# Patient Record
Sex: FEMALE | Race: WHITE | NOT HISPANIC OR LATINO
[De-identification: names, ages, dates, MRNs, and addresses within clinical notes are randomized per-mention and may not be internally consistent; named-entity substitution may affect disease eponyms.]

---

## 2017-01-29 ENCOUNTER — RESULT REVIEW (OUTPATIENT)
Age: 52
End: 2017-01-29

## 2018-02-02 ENCOUNTER — RESULT REVIEW (OUTPATIENT)
Age: 53
End: 2018-02-02

## 2018-08-03 ENCOUNTER — RESULT REVIEW (OUTPATIENT)
Age: 53
End: 2018-08-03

## 2018-10-02 ENCOUNTER — ASOB RESULT (OUTPATIENT)
Age: 53
End: 2018-10-02

## 2018-10-02 ENCOUNTER — APPOINTMENT (OUTPATIENT)
Dept: OBGYN | Facility: CLINIC | Age: 53
End: 2018-10-02
Payer: MEDICARE

## 2018-10-02 PROCEDURE — 76830 TRANSVAGINAL US NON-OB: CPT

## 2018-10-18 ENCOUNTER — APPOINTMENT (OUTPATIENT)
Dept: GYNECOLOGIC ONCOLOGY | Facility: CLINIC | Age: 53
End: 2018-10-18
Payer: MEDICARE

## 2018-10-18 VITALS
HEIGHT: 59 IN | DIASTOLIC BLOOD PRESSURE: 66 MMHG | SYSTOLIC BLOOD PRESSURE: 108 MMHG | WEIGHT: 112 LBS | BODY MASS INDEX: 22.58 KG/M2

## 2018-10-18 DIAGNOSIS — Z80.3 FAMILY HISTORY OF MALIGNANT NEOPLASM OF BREAST: ICD-10-CM

## 2018-10-18 DIAGNOSIS — Z80.8 FAMILY HISTORY OF MALIGNANT NEOPLASM OF OTHER ORGANS OR SYSTEMS: ICD-10-CM

## 2018-10-18 DIAGNOSIS — Z80.1 FAMILY HISTORY OF MALIGNANT NEOPLASM OF TRACHEA, BRONCHUS AND LUNG: ICD-10-CM

## 2018-10-18 DIAGNOSIS — Z80.42 FAMILY HISTORY OF MALIGNANT NEOPLASM OF PROSTATE: ICD-10-CM

## 2018-10-18 DIAGNOSIS — Z80.0 FAMILY HISTORY OF MALIGNANT NEOPLASM OF DIGESTIVE ORGANS: ICD-10-CM

## 2018-10-18 DIAGNOSIS — Z78.9 OTHER SPECIFIED HEALTH STATUS: ICD-10-CM

## 2018-10-18 LAB
HCG UR QL: NEGATIVE
QUALITY CONTROL: YES

## 2018-10-18 PROCEDURE — 57500 BIOPSY OF CERVIX: CPT

## 2018-10-18 PROCEDURE — 81025 URINE PREGNANCY TEST: CPT

## 2018-10-18 PROCEDURE — 99205 OFFICE O/P NEW HI 60 MIN: CPT | Mod: 25

## 2018-10-18 RX ORDER — TAMOXIFEN CITRATE 20 MG/1
20 TABLET, FILM COATED ORAL
Refills: 0 | Status: ACTIVE | COMMUNITY

## 2018-10-23 ENCOUNTER — APPOINTMENT (OUTPATIENT)
Dept: OBGYN | Facility: CLINIC | Age: 53
End: 2018-10-23
Payer: MEDICARE

## 2018-10-23 ENCOUNTER — ASOB RESULT (OUTPATIENT)
Age: 53
End: 2018-10-23

## 2018-10-23 PROCEDURE — 76830 TRANSVAGINAL US NON-OB: CPT

## 2018-10-24 LAB — CORE LAB BIOPSY: NORMAL

## 2019-01-29 ENCOUNTER — APPOINTMENT (OUTPATIENT)
Dept: OBGYN | Facility: CLINIC | Age: 54
End: 2019-01-29
Payer: MEDICARE

## 2019-01-29 ENCOUNTER — ASOB RESULT (OUTPATIENT)
Age: 54
End: 2019-01-29

## 2019-01-29 PROCEDURE — 76830 TRANSVAGINAL US NON-OB: CPT

## 2019-02-05 ENCOUNTER — APPOINTMENT (OUTPATIENT)
Dept: GYNECOLOGIC ONCOLOGY | Facility: CLINIC | Age: 54
End: 2019-02-05
Payer: MEDICARE

## 2019-02-05 VITALS
WEIGHT: 112 LBS | DIASTOLIC BLOOD PRESSURE: 70 MMHG | BODY MASS INDEX: 22.58 KG/M2 | HEIGHT: 59 IN | SYSTOLIC BLOOD PRESSURE: 106 MMHG

## 2019-02-05 DIAGNOSIS — N83.202 UNSPECIFIED OVARIAN CYST, LEFT SIDE: ICD-10-CM

## 2019-02-05 DIAGNOSIS — R18.8 OTHER ASCITES: ICD-10-CM

## 2019-02-05 DIAGNOSIS — N83.9 NONINFLAMMATORY DISORDER OF OVARY, FALLOPIAN TUBE AND BROAD LIGAMENT, UNSPECIFIED: ICD-10-CM

## 2019-02-05 DIAGNOSIS — R93.89 ABNORMAL FINDINGS ON DIAGNOSTIC IMAGING OF OTHER SPECIFIED BODY STRUCTURES: ICD-10-CM

## 2019-02-05 DIAGNOSIS — N83.201 UNSPECIFIED OVARIAN CYST, RIGHT SIDE: ICD-10-CM

## 2019-02-05 PROCEDURE — 58100 BIOPSY OF UTERUS LINING: CPT

## 2019-02-05 PROCEDURE — 99214 OFFICE O/P EST MOD 30 MIN: CPT | Mod: 25

## 2019-02-06 ENCOUNTER — OTHER (OUTPATIENT)
Age: 54
End: 2019-02-06

## 2019-02-07 ENCOUNTER — OTHER (OUTPATIENT)
Age: 54
End: 2019-02-07

## 2019-02-07 LAB — CORE LAB BIOPSY: NORMAL

## 2019-02-14 ENCOUNTER — OUTPATIENT (OUTPATIENT)
Dept: OUTPATIENT SERVICES | Facility: HOSPITAL | Age: 54
LOS: 1 days | End: 2019-02-14
Payer: COMMERCIAL

## 2019-02-14 ENCOUNTER — APPOINTMENT (OUTPATIENT)
Dept: CT IMAGING | Facility: IMAGING CENTER | Age: 54
End: 2019-02-14
Payer: COMMERCIAL

## 2019-02-14 DIAGNOSIS — R18.8 OTHER ASCITES: ICD-10-CM

## 2019-02-14 PROCEDURE — 74177 CT ABD & PELVIS W/CONTRAST: CPT

## 2019-02-14 PROCEDURE — 74177 CT ABD & PELVIS W/CONTRAST: CPT | Mod: 26

## 2019-02-20 ENCOUNTER — TRANSCRIPTION ENCOUNTER (OUTPATIENT)
Age: 54
End: 2019-02-20

## 2019-02-27 PROBLEM — R93.89 THICKENED ENDOMETRIUM: Status: ACTIVE | Noted: 2018-10-18

## 2019-02-27 NOTE — PROCEDURE
[Endometrial Biopsy] : an endometrial biopsy [Thickened Endometrium] : thickened endometrium [Other: ___] : [unfilled] [Patient] : the patient [Verbal Consent] : verbal consent was obtained prior to the procedure and is detailed in the patient's record [Site Verification] : site verification performed. [Time Out] : Time Out Procedure:The following was confirmed prior to the procedure: Correct patient identity, correct site, agreement on the procedure to be done and correct patient position. [Betadine] : betadine [Yes] : the specimen was sent to pathology [No Complications] : none [Tolerated Well] : the patient tolerated the procedure well [Post procedure instructions and information given] : post procedure instructions and information given and reviewed with patient. [0] : 0 [FreeTextEntry1] : Pipelle passed easily

## 2019-02-27 NOTE — LETTER BODY
[Dear  ___] : Dear  [unfilled], [I recently saw our patient [unfilled] for a follow-up visit.] : I recently saw our patient, [unfilled] for a follow-up visit. [Attached please find my note.] : Attached please find my note. [FreeTextEntry2] : Further workup is underway and I will keep you updated on her status. Thank you again for referring this holley patient.  [FreeTextEntry1] : EMB 2/5/19, CT 2/14/19, bloodwork 2/6/19

## 2019-02-27 NOTE — HISTORY OF PRESENT ILLNESS
[FreeTextEntry1] : Ms. Gao is a 53 year old  perimenopausal woman (LMP 10/6/2018) initially referred in 10/2018 for evaluation of thickened endometrial lining and right ovarian cyst. \par \par She has a history of focal atypia of the right breast in 2016 and has been on Tamoxifen since 2016. Plan is to continue Tamoxifen for a total of 5 years. Since , she has been having q6 month ultrasounds. Pelvic sonogram on 8/3/2018 showed an endometrial lining measuring 12.2 mm, subsequent endometrial biopsy 8/3/2018 was negative. Repeat pelvic US on 2018 showed an endometrial lining of 8-9 mm with a simple cyst on the right ovary. Dr. Cavanaugh sent the patient to Dr. Sifuentes (OB/GYN and radiologist) who repeated the pelvic US on 10/2/18 showing an endometrial lining of 15.82 mm, left subcentimeter unilocular cyst, and a right unilocular ovarian cyst with layered debris and increased vascularity noted in the periphery. No additional biopsies have been obtained since 8/3/2018. \par \par Genetic Testing ([employer], 2008): negative \par \par Pelvic US (2018):\par  Uterus: RV\par  Endometrium: 3.6 mm\par  Adnexa: no masses or cysts\par  Cul-de-sac: no FF\par \par Pelvic US (8/3/2018):\par  Uterus: RV\par  Endometrium: 12.2 mm\par  Rt. ovary: no mass/cyst\par  Lt. ovary: simple cyst\par \par Endometrial biopsy (8/3/2018): endometrial tissue showing extensive breakdown changes. \par \par  (8/3/2018): 24; FSH indicative of premenopause.\par \par Pelvic US (2018)\par  Uterus: RV\par  Endometrium: 8-9 mm\par  Rt. ovary: simple cyst \par  Lt. ovary: no mass/cyst\par  Cul-de-sac: no FF\par \par Pelvic US (10/2/2018)\par  Uterus: 7.89 x 5.63 x 5.0 cm\par  Endometrium: 15.82 mm, homogeneous\par  Rt. ovary:4.64 x 4.07 x 3.7 cm; cyst 4 x 4 x 2.3 cm- unilocular, layered debris, increased vascularity noted in periphery\par  Lt. ovary: 1.94 x 1.38 x 1.12 cm; 0.6 cm unilocular cyst-most likely a persistent follicle\par  Cul-de-sac: no FF\par \par 10/18/18 initial consultation visit: posterior cervical biopsy benign. Plan for f/u sono to determine if R ov cyst may have been a CL, in retrospect.\par \par Repeat sono 10/23/18 Interval resolution of R ovarian cyst; 8.5mm homogenous EM; c/w prior menstrual period and resolution of CL cyst. She was advised to f/u with Dr. Cavanaugh and have a repeat sono in 3 months.\par \par 19 Interval history: She has not had any VB since 10/2018.\par F/U sono 19 :  EM 12mm, homogenous. small to moderate free pelvic fluid containing debris, ?blood\par Right tube appears thickened, 3.2 x 1.5cm; some vascularity noted with color flow. Left ovary with 3.8cm hemorrhagic cyst. impression: "thickened tube appears to be a new finding and possibility of neoplasia, though less likely, should be considered. "\par \par Ms. Gao has no other significant PMH. Reports prior history of abnormal pap several years ago s/p D&C (?) with all subsequent paps normal. Family history is significant for breast, colon, and prostate cancer; testing negative for BRCA mutations on 10/24/08. \par \par She is without complaints. Denies pelvic pain, vaginal bleeding/discharge, abdominal bloating/distention, changes in normal bowel/urinary habits, nausea/vomiting, unintentional weight gain/loss, and all other associated signs and symptoms.\par She is here with her  to discuss recent sono findings.\par \par Health Maintenance:\par Pap-2018, negative\par Mammo- breast health per Dr. Cavanaugh\par Colonoscopy- 2017, second degree hemorrhoids, sessile polyp biopsied- tubulovillous adenoma, negative for high grade dysplasia\par \par Referring: Dr. Cavanaugh\par PCP: Dr. Isabel

## 2019-02-28 ENCOUNTER — OTHER (OUTPATIENT)
Age: 54
End: 2019-02-28

## 2019-03-04 ENCOUNTER — RESULT REVIEW (OUTPATIENT)
Age: 54
End: 2019-03-04

## 2019-03-06 ENCOUNTER — OTHER (OUTPATIENT)
Age: 54
End: 2019-03-06

## 2019-03-07 ENCOUNTER — OTHER (OUTPATIENT)
Age: 54
End: 2019-03-07

## 2019-03-10 ENCOUNTER — OUTPATIENT (OUTPATIENT)
Dept: OUTPATIENT SERVICES | Facility: HOSPITAL | Age: 54
LOS: 1 days | End: 2019-03-10
Payer: COMMERCIAL

## 2019-03-10 ENCOUNTER — APPOINTMENT (OUTPATIENT)
Dept: MRI IMAGING | Facility: IMAGING CENTER | Age: 54
End: 2019-03-10
Payer: COMMERCIAL

## 2019-03-10 DIAGNOSIS — N83.202 UNSPECIFIED OVARIAN CYST, LEFT SIDE: ICD-10-CM

## 2019-03-10 PROCEDURE — 72197 MRI PELVIS W/O & W/DYE: CPT | Mod: 26

## 2019-03-10 PROCEDURE — 74183 MRI ABD W/O CNTR FLWD CNTR: CPT | Mod: 26

## 2019-03-10 PROCEDURE — 72197 MRI PELVIS W/O & W/DYE: CPT

## 2019-03-10 PROCEDURE — A9585: CPT

## 2019-03-10 PROCEDURE — 74183 MRI ABD W/O CNTR FLWD CNTR: CPT

## 2019-03-14 ENCOUNTER — APPOINTMENT (OUTPATIENT)
Dept: SURGICAL ONCOLOGY | Facility: CLINIC | Age: 54
End: 2019-03-14
Payer: COMMERCIAL

## 2019-03-14 VITALS
RESPIRATION RATE: 15 BRPM | SYSTOLIC BLOOD PRESSURE: 113 MMHG | WEIGHT: 114 LBS | DIASTOLIC BLOOD PRESSURE: 79 MMHG | HEIGHT: 59 IN | BODY MASS INDEX: 22.98 KG/M2 | OXYGEN SATURATION: 98 % | HEART RATE: 89 BPM

## 2019-03-14 PROCEDURE — 99245 OFF/OP CONSLTJ NEW/EST HI 55: CPT

## 2019-03-18 NOTE — CONSULT LETTER
[Dear  ___] : Dear ~ANYI, [Consult Letter:] : I had the pleasure of evaluating your patient, [unfilled]. [Please see my note below.] : Please see my note below. [Consult Closing:] : Thank you very much for allowing me to participate in the care of this patient.  If you have any questions, please do not hesitate to contact me. [FreeTextEntry2] : Dr. Jessica Acosta [Sincerely,] : Sincerely, [FreeTextEntry3] : Yuan Moon\par [phone] [DrKrishan  ___] : Dr. MCQUEEN [DrKrishan ___] : Dr. MCQUEEN

## 2019-03-18 NOTE — REASON FOR VISIT
[Initial Consultation] : an initial consultation for [Family Member] : family member [Referred By: ___] : Referred By: [unfilled]

## 2019-03-18 NOTE — ASSESSMENT
[FreeTextEntry1] : 54 y/o female with hypervascular pancreatic body lesion.\par \par Plan:  CT scan imaging review with patient.  Findings are most consistent with an neuroendocrine tumor of the pancreas.  Inability to visualize lesion on MRI may be related to timing of contrast.  Tissue diagnosis is indicated.  Will refer to Dr. Travis Mckenzie of [facility] for EUS/FNA.  Once diagnosis obtained, will discuss need for minimally invasive distal pancreatectomy, possible spleen preserving if indicated. If pancreatic NET and low grade, I explained these lesions have excellent prognosis after resection and do not require adjuvant treatment when nonmetastatic.  All questions answered.

## 2019-03-18 NOTE — PHYSICAL EXAM
[Normal] : supple, no neck mass and thyroid not enlarged [Normal Neck Lymph Nodes] : normal neck lymph nodes  [Normal Supraclavicular Lymph Nodes] : normal supraclavicular lymph nodes [Normal Groin Lymph Nodes] : normal groin lymph nodes [Normal Axillary Lymph Nodes] : normal axillary lymph nodes [Normal] : grossly intact

## 2019-03-28 ENCOUNTER — APPOINTMENT (OUTPATIENT)
Dept: GASTROENTEROLOGY | Facility: CLINIC | Age: 54
End: 2019-03-28
Payer: COMMERCIAL

## 2019-03-28 VITALS
DIASTOLIC BLOOD PRESSURE: 60 MMHG | HEART RATE: 82 BPM | OXYGEN SATURATION: 97 % | BODY MASS INDEX: 23.39 KG/M2 | HEIGHT: 59 IN | RESPIRATION RATE: 14 BRPM | WEIGHT: 116 LBS | SYSTOLIC BLOOD PRESSURE: 98 MMHG | TEMPERATURE: 97.7 F

## 2019-03-28 PROCEDURE — 99203 OFFICE O/P NEW LOW 30 MIN: CPT

## 2019-04-09 ENCOUNTER — APPOINTMENT (OUTPATIENT)
Age: 54
End: 2019-04-09
Payer: COMMERCIAL

## 2019-04-09 ENCOUNTER — OUTPATIENT (OUTPATIENT)
Dept: OUTPATIENT SERVICES | Facility: HOSPITAL | Age: 54
LOS: 1 days | End: 2019-04-09
Payer: COMMERCIAL

## 2019-04-09 DIAGNOSIS — Z00.8 ENCOUNTER FOR OTHER GENERAL EXAMINATION: ICD-10-CM

## 2019-04-09 PROCEDURE — 71260 CT THORAX DX C+: CPT

## 2019-04-09 PROCEDURE — 71260 CT THORAX DX C+: CPT | Mod: 26

## 2019-04-09 NOTE — HISTORY OF PRESENT ILLNESS
[FreeTextEntry1] : 53 year old female referred by Dr. Yuan Moon for evaluation of 9 mm pancreatic nodule seen on CT. Lesion is suspicious for islet cell tumor as it is hypervascular and isodense. There is also scattered LNs in the chest and mediastinum.

## 2019-04-09 NOTE — PHYSICAL EXAM
[General Appearance - In No Acute Distress] : in no acute distress [General Appearance - Alert] : alert [General Appearance - Well Developed] : well developed [General Appearance - Well Nourished] : well nourished [Sclera] : the sclera and conjunctiva were normal [Neck Cervical Mass (___cm)] : no neck mass was observed [Neck Appearance] : the appearance of the neck was normal [Exaggerated Use Of Accessory Muscles For Inspiration] : no accessory muscle use [Jugular Venous Distention Increased] : there was no jugular-venous distention [Heart Sounds] : normal S1 and S2 [Bowel Sounds] : normal bowel sounds [Edema] : there was no peripheral edema [Abdomen Soft] : soft [Abdomen Tenderness] : non-tender [Abdomen Mass (___ Cm)] : no abdominal mass palpated [Cervical Lymph Nodes Enlarged Posterior Bilaterally] : posterior cervical [Cervical Lymph Nodes Enlarged Anterior Bilaterally] : anterior cervical [Nail Clubbing] : no clubbing  or cyanosis of the fingernails [No CVA Tenderness] : no ~M costovertebral angle tenderness [] : no rash [No Focal Deficits] : no focal deficits [Impaired Insight] : insight and judgment were intact [Affect] : the affect was normal [Oriented To Time, Place, And Person] : oriented to person, place, and time

## 2019-04-09 NOTE — ASSESSMENT
[FreeTextEntry1] : Imaging personally reviewed. Plan for EUS FNA of pancreatic lesions an/or any suspicious LNs\par

## 2019-04-15 ENCOUNTER — APPOINTMENT (OUTPATIENT)
Dept: SURGICAL ONCOLOGY | Facility: CLINIC | Age: 54
End: 2019-04-15
Payer: COMMERCIAL

## 2019-04-15 VITALS
WEIGHT: 116 LBS | RESPIRATION RATE: 16 BRPM | OXYGEN SATURATION: 98 % | SYSTOLIC BLOOD PRESSURE: 125 MMHG | BODY MASS INDEX: 23.39 KG/M2 | HEART RATE: 82 BPM | DIASTOLIC BLOOD PRESSURE: 87 MMHG | HEIGHT: 59 IN

## 2019-04-15 PROCEDURE — 99244 OFF/OP CNSLTJ NEW/EST MOD 40: CPT

## 2019-04-18 ENCOUNTER — OUTPATIENT (OUTPATIENT)
Dept: OUTPATIENT SERVICES | Facility: HOSPITAL | Age: 54
LOS: 1 days | Discharge: HOME | End: 2019-04-18

## 2019-04-18 DIAGNOSIS — Z15.09 GENETIC SUSCEPTIBILITY TO OTHER MALIGNANT NEOPLASM: ICD-10-CM

## 2019-04-22 ENCOUNTER — APPOINTMENT (OUTPATIENT)
Dept: HEMATOLOGY ONCOLOGY | Facility: CLINIC | Age: 54
End: 2019-04-22
Payer: SELF-PAY

## 2019-04-22 ENCOUNTER — LABORATORY RESULT (OUTPATIENT)
Age: 54
End: 2019-04-22

## 2019-04-22 PROCEDURE — 96040M: CUSTOM

## 2019-04-22 PROCEDURE — 99499A: CUSTOM | Mod: NC

## 2019-04-23 ENCOUNTER — OUTPATIENT (OUTPATIENT)
Dept: OUTPATIENT SERVICES | Facility: HOSPITAL | Age: 54
LOS: 1 days | End: 2019-04-23
Payer: COMMERCIAL

## 2019-04-23 ENCOUNTER — APPOINTMENT (OUTPATIENT)
Age: 54
End: 2019-04-23

## 2019-04-23 ENCOUNTER — RESULT REVIEW (OUTPATIENT)
Age: 54
End: 2019-04-23

## 2019-04-23 DIAGNOSIS — K86.9 DISEASE OF PANCREAS, UNSPECIFIED: ICD-10-CM

## 2019-04-23 PROCEDURE — 88312 SPECIAL STAINS GROUP 1: CPT

## 2019-04-23 PROCEDURE — 88342 IMHCHEM/IMCYTCHM 1ST ANTB: CPT

## 2019-04-23 PROCEDURE — 88341 IMHCHEM/IMCYTCHM EA ADD ANTB: CPT | Mod: 26,59

## 2019-04-23 PROCEDURE — 88305 TISSUE EXAM BY PATHOLOGIST: CPT | Mod: 26

## 2019-04-23 PROCEDURE — 88313 SPECIAL STAINS GROUP 2: CPT

## 2019-04-23 PROCEDURE — 88305 TISSUE EXAM BY PATHOLOGIST: CPT

## 2019-04-23 PROCEDURE — 88313 SPECIAL STAINS GROUP 2: CPT | Mod: 26

## 2019-04-23 PROCEDURE — 88342 IMHCHEM/IMCYTCHM 1ST ANTB: CPT | Mod: 26,59

## 2019-04-23 PROCEDURE — 88312 SPECIAL STAINS GROUP 1: CPT | Mod: 26

## 2019-04-23 PROCEDURE — 88341 IMHCHEM/IMCYTCHM EA ADD ANTB: CPT

## 2019-04-23 PROCEDURE — 43242 EGD US FINE NEEDLE BX/ASPIR: CPT

## 2019-04-23 PROCEDURE — 88360 TUMOR IMMUNOHISTOCHEM/MANUAL: CPT

## 2019-04-23 PROCEDURE — 88360 TUMOR IMMUNOHISTOCHEM/MANUAL: CPT | Mod: 26

## 2019-04-23 PROCEDURE — 43242 EGD US FINE NEEDLE BX/ASPIR: CPT | Mod: GC

## 2019-04-23 PROCEDURE — 88307 TISSUE EXAM BY PATHOLOGIST: CPT | Mod: 26

## 2019-04-23 PROCEDURE — 88307 TISSUE EXAM BY PATHOLOGIST: CPT

## 2019-05-07 ENCOUNTER — APPOINTMENT (OUTPATIENT)
Dept: SURGICAL ONCOLOGY | Facility: CLINIC | Age: 54
End: 2019-05-07
Payer: COMMERCIAL

## 2019-05-07 VITALS
RESPIRATION RATE: 15 BRPM | HEIGHT: 59 IN | HEART RATE: 89 BPM | BODY MASS INDEX: 22.98 KG/M2 | DIASTOLIC BLOOD PRESSURE: 85 MMHG | WEIGHT: 114 LBS | SYSTOLIC BLOOD PRESSURE: 123 MMHG

## 2019-05-07 PROCEDURE — 99214 OFFICE O/P EST MOD 30 MIN: CPT

## 2019-05-08 NOTE — REASON FOR VISIT
[Initial Consultation] : an initial consultation for [Other: _____] : [unfilled] [FreeTextEntry2] : Right breast atypia

## 2019-05-08 NOTE — ASSESSMENT
[FreeTextEntry1] : Breast imaging:\par December 2018 bilateral mammogram and sonogram @[facility] were ok; imaging data submitted for entry into our system.\par These will be repeated December 2019...............................\par \par Her annual breast MRI (Ghada score 20) should be June 2019, at our facility, prescriptions entered today\par \par Clinically she appears to be doing well.\par \par Her there are no problems we will see her in approximately 6 months, sooner if needed

## 2019-05-08 NOTE — HISTORY OF PRESENT ILLNESS
[de-identified] : 53-year-old lady (originally under the care of Dr. Piper) who I had seen for periodic breast examinations, commencing in 2000.\par \par I last examined her 2014.\par \par 2016 she was diagnosed with atypia on a right breast biopsy.- DCD\par +incidental SARCOID - NO rheum eval yet\par + Tamoxifen: Dr. Will Gonzalez\par \par (19:\par Seen by Dr Sarah Martinez (pul) for bilateral Pulmonary nodules on CT chest.\par No intervention warranted.\par Ongoing followup planned).\par \par She has had nine other breast biopsies:\par Bilateral excisional breast biopsies were benign.\par She has had 5 benign biopsies.\par A single left breast needle biopsy was also benign.\par 2013 right MRI core biopsy benign and concordant\par \par \par Ghada score= 20\par \par NO personal  history of breast or ovarian cancer.\par \par +FH:\par A maternal aunt had breast cancer at 68\par Her maternal grandmother had breast cancer in her late 60s\par TWO Maternal cousins had ovarian cancer in their 50s\par \par Her own genetic testing demonstrates NO deleterious dictations.\par Since her original testing was ~, she has a followup appointment scheduled with Cristina Tucker in 2019 for a broader assessment\par \par +ASHKENAZI\par \par Menarche at 13.\par , first at 32\par \par Her gynecologist is Dr. Theodore ARRINGTON\par 2019 Pap smear was normal\par 2018 she was seen by gyn-onc (Dr. Jessica Acosta) for a thickened endometrium.\par \par Her internist is Dr. Arnoldo Isabel.\par Her only current medication is tamoxifen (above)\par \par Colonoscopy: @51 with DR Wilberto TINAJERO, ok x 5 yr\par +FH:\par Father had colorectal cancer\par \par \par 2019 she was seen by my associate RITO for evaluation of an incidental 9 mm hypervascular with pancreatic body mass on CT abdomen.\par Subsequent MRI abdomen identified scattered bilateral pulmonary nodules, the study failed to duplicate the pancreatic finding.\par Findings felt most consistent with a neuroendocrine tumor she was referred to GI to see Dr. Travis Mckenzie for EUS/FNA.\par Consultations , procedure is sched'd for 2109\par \par \par Regarding the incidental pulmonary nodules described above, she will be scheduled an appointment with Dr. Liu Martinez (pulmonary medicine)\par \par \par She also reports a two-month history of intermittent numbness in the right upper extremity.\par No preceding injury.\par No provocative a palliative factors.\par No other neurologic signs or symptoms.\par \par I provided her with the contact information for Dr. Art Butler for a formal evaluation

## 2019-05-08 NOTE — REVIEW OF SYSTEMS
[Negative] : Endocrine [FreeTextEntry8] : Pancreatic neoplasm [FreeTextEntry1] : increased risk of breast cancer

## 2019-05-08 NOTE — PHYSICAL EXAM
[Normal] : supple, no neck mass and thyroid not enlarged [Normal Neck Lymph Nodes] : normal neck lymph nodes  [Normal Supraclavicular Lymph Nodes] : normal supraclavicular lymph nodes [Normal Axillary Lymph Nodes] : normal axillary lymph nodes [Normal Groin Lymph Nodes] : normal groin lymph nodes [Normal] : full range of motion and no deformities appreciated [de-identified] : groins not examined [de-identified] : below

## 2019-05-09 NOTE — HISTORY OF PRESENT ILLNESS
[de-identified] : Ms. Gao is a 54 y/o female with history of breast atypia on Tamoxifen who was found on CT abdomen/pelvis 02/14/2019 to have a new 9 mm hypervascular mid-pancreatic body mass for which islet cell tumor was considered.  No regional adenopathy was identified.  A subsequent MRI abdomen 03/10/2019 demonstrated scatter bilateral pulmonary nodule and partially images mediastinal/hilar adenopathy.  Pancreatic lesion was not visualized on study.  She is awaiting CT chest at this time.\par Patient denies abdominal pain, nausea/emesis or weight loss. She denies flushing, palpitations or diarrhea.\par \par 05/07/2019:  Patient is s/p EUS/FNA 04/23/2019.  She tolerated the procedure well.  Pathology of 9 mm pancreatic body lesion demonstrates lesion positive for synaptophysin and chromogranin suggestive of neuroendocrine tumor with low Ki-67 proliferation index.  However, a solid pseudopapillary neoplasm was not ruled out.\par Patient continues to feel well and has no abdominal complaints.

## 2019-05-09 NOTE — ASSESSMENT
[FreeTextEntry1] : 52 y/o female with early nonfunctional pancreatic neuroendocrine tumor.\par \par Plan:  Gallium-dotatate PET/CT is indicated to evaluate extent of disease.  Surgical resection is indicated, especially given patient's young age, which would likely be a spleen sparing distal pancreatectomy at this size.  However, there is no urgency for surgery at this time given small lesion and monitoring is also reasonable and safe, but would be lifelong with risk of progression.  Patient will consider her options and return to office for further discussion after PET/CT.  All questions answered.

## 2019-05-12 ENCOUNTER — APPOINTMENT (OUTPATIENT)
Dept: MRI IMAGING | Facility: IMAGING CENTER | Age: 54
End: 2019-05-12

## 2019-05-17 ENCOUNTER — OUTPATIENT (OUTPATIENT)
Dept: OUTPATIENT SERVICES | Facility: HOSPITAL | Age: 54
LOS: 1 days | Discharge: HOME | End: 2019-05-17

## 2019-05-17 DIAGNOSIS — Z15.09 GENETIC SUSCEPTIBILITY TO OTHER MALIGNANT NEOPLASM: ICD-10-CM

## 2019-05-21 ENCOUNTER — APPOINTMENT (OUTPATIENT)
Dept: HEMATOLOGY ONCOLOGY | Facility: CLINIC | Age: 54
End: 2019-05-21
Payer: SELF-PAY

## 2019-05-21 PROCEDURE — 99499A: CUSTOM | Mod: NC

## 2019-05-22 ENCOUNTER — OTHER (OUTPATIENT)
Age: 54
End: 2019-05-22

## 2019-05-28 ENCOUNTER — APPOINTMENT (OUTPATIENT)
Dept: NUCLEAR MEDICINE | Facility: IMAGING CENTER | Age: 54
End: 2019-05-28
Payer: COMMERCIAL

## 2019-05-28 ENCOUNTER — APPOINTMENT (OUTPATIENT)
Dept: MRI IMAGING | Facility: IMAGING CENTER | Age: 54
End: 2019-05-28
Payer: COMMERCIAL

## 2019-05-28 ENCOUNTER — OUTPATIENT (OUTPATIENT)
Dept: OUTPATIENT SERVICES | Facility: HOSPITAL | Age: 54
LOS: 1 days | End: 2019-05-28
Payer: COMMERCIAL

## 2019-05-28 DIAGNOSIS — M54.12 RADICULOPATHY, CERVICAL REGION: ICD-10-CM

## 2019-05-28 PROCEDURE — 72141 MRI NECK SPINE W/O DYE: CPT

## 2019-05-28 PROCEDURE — 72141 MRI NECK SPINE W/O DYE: CPT | Mod: 26

## 2019-06-03 ENCOUNTER — APPOINTMENT (OUTPATIENT)
Dept: NUCLEAR MEDICINE | Facility: IMAGING CENTER | Age: 54
End: 2019-06-03

## 2019-06-27 ENCOUNTER — FORM ENCOUNTER (OUTPATIENT)
Age: 54
End: 2019-06-27

## 2019-06-28 ENCOUNTER — APPOINTMENT (OUTPATIENT)
Dept: MRI IMAGING | Facility: IMAGING CENTER | Age: 54
End: 2019-06-28
Payer: COMMERCIAL

## 2019-06-28 ENCOUNTER — OUTPATIENT (OUTPATIENT)
Dept: OUTPATIENT SERVICES | Facility: HOSPITAL | Age: 54
LOS: 1 days | End: 2019-06-28
Payer: COMMERCIAL

## 2019-06-28 DIAGNOSIS — Z00.8 ENCOUNTER FOR OTHER GENERAL EXAMINATION: ICD-10-CM

## 2019-06-28 DIAGNOSIS — N60.91 UNSPECIFIED BENIGN MAMMARY DYSPLASIA OF RIGHT BREAST: ICD-10-CM

## 2019-06-28 PROCEDURE — 77049 MRI BREAST C-+ W/CAD BI: CPT | Mod: 26

## 2019-06-28 PROCEDURE — C8937: CPT

## 2019-06-28 PROCEDURE — C8908: CPT

## 2019-06-28 PROCEDURE — A9585: CPT

## 2019-09-05 ENCOUNTER — APPOINTMENT (OUTPATIENT)
Dept: SURGICAL ONCOLOGY | Facility: CLINIC | Age: 54
End: 2019-09-05
Payer: COMMERCIAL

## 2019-09-05 VITALS
BODY MASS INDEX: 23.18 KG/M2 | SYSTOLIC BLOOD PRESSURE: 120 MMHG | DIASTOLIC BLOOD PRESSURE: 80 MMHG | HEART RATE: 89 BPM | OXYGEN SATURATION: 98 % | WEIGHT: 115 LBS | HEIGHT: 59 IN

## 2019-09-05 PROCEDURE — 99214 OFFICE O/P EST MOD 30 MIN: CPT

## 2019-10-31 ENCOUNTER — APPOINTMENT (OUTPATIENT)
Dept: GASTROENTEROLOGY | Facility: CLINIC | Age: 54
End: 2019-10-31
Payer: COMMERCIAL

## 2019-10-31 VITALS
DIASTOLIC BLOOD PRESSURE: 64 MMHG | HEIGHT: 59 IN | WEIGHT: 117 LBS | OXYGEN SATURATION: 97 % | SYSTOLIC BLOOD PRESSURE: 114 MMHG | HEART RATE: 101 BPM | BODY MASS INDEX: 23.59 KG/M2 | RESPIRATION RATE: 14 BRPM | TEMPERATURE: 97.5 F

## 2019-10-31 DIAGNOSIS — K86.89 OTHER SPECIFIED DISEASES OF PANCREAS: ICD-10-CM

## 2019-10-31 PROCEDURE — 99212 OFFICE O/P EST SF 10 MIN: CPT

## 2019-11-14 NOTE — HISTORY OF PRESENT ILLNESS
[FreeTextEntry1] : 54 previously referred for EUS bx of a pancreas lesion which was small and indeterminant. This was found to be a low proliferation neuroendocrine. It does not appear to be a functional PNET. A recent scan did not identify it but she is scheduled for an MRI at [facility]. She decided not to undergo a resection and to monitor it based on advice form an oncologist there. She denies any flushing, sweating, hypoglycemia, or episodes of pain. No jaundice or icterus. No pruritus.

## 2019-11-14 NOTE — PHYSICAL EXAM
[General Appearance - In No Acute Distress] : in no acute distress [General Appearance - Alert] : alert [General Appearance - Well Developed] : well developed [Sclera] : the sclera and conjunctiva were normal [General Appearance - Well Nourished] : well nourished [Neck Appearance] : the appearance of the neck was normal [Neck Cervical Mass (___cm)] : no neck mass was observed [Jugular Venous Distention Increased] : there was no jugular-venous distention [Exaggerated Use Of Accessory Muscles For Inspiration] : no accessory muscle use [Edema] : there was no peripheral edema [Heart Sounds] : normal S1 and S2 [Abdomen Soft] : soft [Abdomen Tenderness] : non-tender [Bowel Sounds] : normal bowel sounds [Abdomen Mass (___ Cm)] : no abdominal mass palpated [Cervical Lymph Nodes Enlarged Anterior Bilaterally] : anterior cervical [No CVA Tenderness] : no ~M costovertebral angle tenderness [Cervical Lymph Nodes Enlarged Posterior Bilaterally] : posterior cervical [] : no rash [Nail Clubbing] : no clubbing  or cyanosis of the fingernails [No Focal Deficits] : no focal deficits [Oriented To Time, Place, And Person] : oriented to person, place, and time [Affect] : the affect was normal [Impaired Insight] : insight and judgment were intact

## 2019-11-14 NOTE — ASSESSMENT
[FreeTextEntry1] : Low proliferation index neuroendocrine tumor of the pancreas neck\par Low risk overall\par She has had a second opinion form [facility] and she wants to survey\par She will be getting an MRI\par We will wait to see what this shows

## 2019-12-18 ENCOUNTER — TRANSCRIPTION ENCOUNTER (OUTPATIENT)
Age: 54
End: 2019-12-18

## 2020-01-02 ENCOUNTER — FORM ENCOUNTER (OUTPATIENT)
Age: 55
End: 2020-01-02

## 2020-01-03 ENCOUNTER — APPOINTMENT (OUTPATIENT)
Dept: ULTRASOUND IMAGING | Facility: IMAGING CENTER | Age: 55
End: 2020-01-03
Payer: COMMERCIAL

## 2020-01-03 ENCOUNTER — OUTPATIENT (OUTPATIENT)
Dept: OUTPATIENT SERVICES | Facility: HOSPITAL | Age: 55
LOS: 1 days | End: 2020-01-03
Payer: COMMERCIAL

## 2020-01-03 ENCOUNTER — APPOINTMENT (OUTPATIENT)
Dept: MAMMOGRAPHY | Facility: IMAGING CENTER | Age: 55
End: 2020-01-03
Payer: COMMERCIAL

## 2020-01-03 DIAGNOSIS — Z00.8 ENCOUNTER FOR OTHER GENERAL EXAMINATION: ICD-10-CM

## 2020-01-03 PROCEDURE — 76641 ULTRASOUND BREAST COMPLETE: CPT | Mod: 26,50

## 2020-01-03 PROCEDURE — 77063 BREAST TOMOSYNTHESIS BI: CPT

## 2020-01-03 PROCEDURE — 77063 BREAST TOMOSYNTHESIS BI: CPT | Mod: 26

## 2020-01-03 PROCEDURE — 77067 SCR MAMMO BI INCL CAD: CPT | Mod: 26

## 2020-01-03 PROCEDURE — 77067 SCR MAMMO BI INCL CAD: CPT

## 2020-01-03 PROCEDURE — 76641 ULTRASOUND BREAST COMPLETE: CPT

## 2020-03-10 ENCOUNTER — RESULT REVIEW (OUTPATIENT)
Age: 55
End: 2020-03-10

## 2020-07-06 ENCOUNTER — APPOINTMENT (OUTPATIENT)
Dept: ULTRASOUND IMAGING | Facility: IMAGING CENTER | Age: 55
End: 2020-07-06
Payer: COMMERCIAL

## 2020-07-06 ENCOUNTER — RESULT REVIEW (OUTPATIENT)
Age: 55
End: 2020-07-06

## 2020-07-06 ENCOUNTER — OUTPATIENT (OUTPATIENT)
Dept: OUTPATIENT SERVICES | Facility: HOSPITAL | Age: 55
LOS: 1 days | End: 2020-07-06
Payer: COMMERCIAL

## 2020-07-06 DIAGNOSIS — Z00.8 ENCOUNTER FOR OTHER GENERAL EXAMINATION: ICD-10-CM

## 2020-07-06 PROCEDURE — 76642 ULTRASOUND BREAST LIMITED: CPT | Mod: 26,RT

## 2020-07-06 PROCEDURE — 76642 ULTRASOUND BREAST LIMITED: CPT

## 2020-07-09 ENCOUNTER — RESULT REVIEW (OUTPATIENT)
Age: 55
End: 2020-07-09

## 2020-07-09 ENCOUNTER — APPOINTMENT (OUTPATIENT)
Dept: MRI IMAGING | Facility: IMAGING CENTER | Age: 55
End: 2020-07-09
Payer: COMMERCIAL

## 2020-07-09 ENCOUNTER — OUTPATIENT (OUTPATIENT)
Dept: OUTPATIENT SERVICES | Facility: HOSPITAL | Age: 55
LOS: 1 days | End: 2020-07-09
Payer: COMMERCIAL

## 2020-07-09 DIAGNOSIS — Z00.8 ENCOUNTER FOR OTHER GENERAL EXAMINATION: ICD-10-CM

## 2020-07-09 DIAGNOSIS — N60.91 UNSPECIFIED BENIGN MAMMARY DYSPLASIA OF RIGHT BREAST: ICD-10-CM

## 2020-07-09 PROCEDURE — A9585: CPT

## 2020-07-09 PROCEDURE — C8937: CPT

## 2020-07-09 PROCEDURE — 77049 MRI BREAST C-+ W/CAD BI: CPT | Mod: 26

## 2020-07-09 PROCEDURE — C8908: CPT

## 2020-08-05 NOTE — PHYSICAL EXAM
[Normal] : supple, no neck mass and thyroid not enlarged [Normal Neck Lymph Nodes] : normal neck lymph nodes  [Normal Supraclavicular Lymph Nodes] : normal supraclavicular lymph nodes [Normal Groin Lymph Nodes] : normal groin lymph nodes [Normal Axillary Lymph Nodes] : normal axillary lymph nodes [Normal] : normal appearance, no rash, nodules, vesicles, ulcers, erythema [de-identified] : groins not examined [de-identified] : below

## 2020-08-05 NOTE — REVIEW OF SYSTEMS
[Negative] : Integumentary [FreeTextEntry8] : Pancreatic nodule [FreeTextEntry1] : Increased risk of breast cancer

## 2020-08-05 NOTE — ASSESSMENT
[FreeTextEntry1] : June 2019 breast MRI at 450: BI-RADS 2.\par We'll repeat June 2020 (Ghada score 20), prescription entered\par \par Annual mammogram and ultrasound are December 2019 at [facility], prescription provided.\par \par Clinically doing well.\par \par If no problems we will see her in another year, sooner if needed\par \par \par January 6, 2020.\par Annual, bilateral, mammogram and ultrasound at 450: BI-RADS 3.\par Right breast (3:00) six-month followup sonogram recommended.\par Prescription entered today\par \par \par 07-06-20:\par Targeted right breast ultrasound (short-term follow-up (3:00) at 450: BI-RADS 3.\par Bilateral breast imaging due in January 2021.\par Prescriptions entered July 21, 2020\par \par \par 07-09-20:\par Breast MRI at 450: BI-RADS 2.\par Lary notified her of the results.\par We'll repeat periodically balancing her increased risk of breast cancer (detail score 20) against the concern of cumulative gadolinium exposure.............................

## 2020-08-05 NOTE — REASON FOR VISIT
[Follow-Up Visit] : a follow-up visit for [Other: _____] : [unfilled] [FreeTextEntry2] : Increased risk of breast cancer

## 2020-08-05 NOTE — HISTORY OF PRESENT ILLNESS
[de-identified] : 54 year-old lady (originally under the care of Dr. Piper) who I have seen for periodic breast examinations, commencing in 2000.\par \par \par 2016 she was diagnosed with atypia on a right breast biopsy.- DCD\par +incidental SARCOID - NO rheum eval yet\par + Tamoxifen: Dr. Will Gonzalez\par \par (19:\par Seen by Dr Sarah Martinez (pul) for bilateral Pulmonary nodules on CT chest.\par No intervention warranted.\par Ongoing followup).\par \par She has had nine other breast biopsies:\par (Bilateral excisional breast biopsies were benign.\par She has had 5 benign biopsies.\par A single left breast needle biopsy was also benign.\par 2013 right MRI core biopsy benign and concordant)\par \par \par Ghada score= 20\par \par NO personal  history of breast or ovarian cancer.\par \par +FH:\par A maternal aunt had breast cancer at 68\par Her maternal grandmother had breast cancer in her late 60s\par TWO Maternal cousins had ovarian cancer in their 50s\par \par Her own genetic testing demonstrates NO deleterious dictations.\par Since her original testing was ~, she has a followup appointment scheduled with Cristina Tucker in 2019 for a broader assessment - Unremarkable\par \par +ASHKENAZI\par \par Menarche at 13.\par , first at 32\par \par Her gynecologist is Dr. Theodore ARRINGTON\par 2019 Pap smear was normal\par 2018 she was seen by gyn-onc (Dr. Jessica Acosta) for a thickened endometrium.\par \par Her internist is Dr. Arnoldo MCINTYRE.\par Her only current medication is tamoxifen (above)\par \par Colonoscopy: @51 with DR Wilberto TINAJERO, ok x 5 yr\par +FH:\par Father had colorectal cancer\par \par Known, asymptomatic, incidental, ~1 cm pancreatic mass.\par EUS biopsy: Nonfunctioning neuroendocrine tumor versus solid pseudo-papillary neoplasm.\par She had seen my partner, JW.\par She now follows up at [facility].\par

## 2020-09-03 ENCOUNTER — APPOINTMENT (OUTPATIENT)
Dept: SURGICAL ONCOLOGY | Facility: CLINIC | Age: 55
End: 2020-09-03
Payer: COMMERCIAL

## 2020-09-03 VITALS
SYSTOLIC BLOOD PRESSURE: 120 MMHG | RESPIRATION RATE: 15 BRPM | BODY MASS INDEX: 23.18 KG/M2 | HEIGHT: 59 IN | DIASTOLIC BLOOD PRESSURE: 81 MMHG | WEIGHT: 115 LBS | HEART RATE: 89 BPM | OXYGEN SATURATION: 98 %

## 2020-09-03 PROCEDURE — 99214 OFFICE O/P EST MOD 30 MIN: CPT

## 2020-10-31 ENCOUNTER — TRANSCRIPTION ENCOUNTER (OUTPATIENT)
Age: 55
End: 2020-10-31

## 2020-11-01 RX ORDER — SODIUM CHLORIDE 9 MG/ML
1000 INJECTION INTRAMUSCULAR; INTRAVENOUS; SUBCUTANEOUS
Refills: 0 | Status: DISCONTINUED | OUTPATIENT
Start: 2020-11-02 | End: 2020-11-16

## 2020-11-02 ENCOUNTER — OUTPATIENT (OUTPATIENT)
Dept: OUTPATIENT SERVICES | Facility: HOSPITAL | Age: 55
LOS: 1 days | Discharge: HOME | End: 2020-11-02
Payer: COMMERCIAL

## 2020-11-02 ENCOUNTER — APPOINTMENT (OUTPATIENT)
Dept: GASTROENTEROLOGY | Facility: HOSPITAL | Age: 55
End: 2020-11-02

## 2020-11-02 VITALS
DIASTOLIC BLOOD PRESSURE: 96 MMHG | SYSTOLIC BLOOD PRESSURE: 135 MMHG | RESPIRATION RATE: 22 BRPM | HEIGHT: 59 IN | TEMPERATURE: 98 F | WEIGHT: 113.98 LBS | OXYGEN SATURATION: 100 % | HEART RATE: 85 BPM

## 2020-11-02 VITALS
HEART RATE: 73 BPM | OXYGEN SATURATION: 100 % | SYSTOLIC BLOOD PRESSURE: 110 MMHG | DIASTOLIC BLOOD PRESSURE: 74 MMHG | RESPIRATION RATE: 16 BRPM

## 2020-11-02 DIAGNOSIS — D3A.8 OTHER BENIGN NEUROENDOCRINE TUMORS: ICD-10-CM

## 2020-11-02 DIAGNOSIS — R89.7 ABNORMAL HISTOLOGICAL FINDINGS IN SPECIMENS FROM OTHER ORGANS, SYSTEMS AND TISSUES: Chronic | ICD-10-CM

## 2020-11-02 LAB — HCG UR QL: NEGATIVE — SIGNIFICANT CHANGE UP

## 2020-11-02 PROCEDURE — 43259 EGD US EXAM DUODENUM/JEJUNUM: CPT

## 2020-11-02 RX ADMIN — SODIUM CHLORIDE 30 MILLILITER(S): 9 INJECTION INTRAMUSCULAR; INTRAVENOUS; SUBCUTANEOUS at 09:11

## 2020-11-11 PROBLEM — D86.9 SARCOIDOSIS, UNSPECIFIED: Chronic | Status: ACTIVE | Noted: 2020-11-02

## 2020-12-03 ENCOUNTER — OUTPATIENT (OUTPATIENT)
Dept: OUTPATIENT SERVICES | Facility: HOSPITAL | Age: 55
LOS: 1 days | End: 2020-12-03
Payer: COMMERCIAL

## 2020-12-03 ENCOUNTER — APPOINTMENT (OUTPATIENT)
Dept: CT IMAGING | Facility: IMAGING CENTER | Age: 55
End: 2020-12-03
Payer: COMMERCIAL

## 2020-12-03 DIAGNOSIS — R89.7 ABNORMAL HISTOLOGICAL FINDINGS IN SPECIMENS FROM OTHER ORGANS, SYSTEMS AND TISSUES: Chronic | ICD-10-CM

## 2020-12-03 DIAGNOSIS — R93.89 ABNORMAL FINDINGS ON DIAGNOSTIC IMAGING OF OTHER SPECIFIED BODY STRUCTURES: ICD-10-CM

## 2020-12-03 DIAGNOSIS — D86.9 SARCOIDOSIS, UNSPECIFIED: ICD-10-CM

## 2020-12-03 DIAGNOSIS — R91.8 OTHER NONSPECIFIC ABNORMAL FINDING OF LUNG FIELD: ICD-10-CM

## 2020-12-03 DIAGNOSIS — Z00.8 ENCOUNTER FOR OTHER GENERAL EXAMINATION: ICD-10-CM

## 2020-12-03 PROCEDURE — 71250 CT THORAX DX C-: CPT

## 2020-12-03 PROCEDURE — 71250 CT THORAX DX C-: CPT | Mod: 26

## 2021-01-22 ENCOUNTER — APPOINTMENT (OUTPATIENT)
Dept: ULTRASOUND IMAGING | Facility: IMAGING CENTER | Age: 56
End: 2021-01-22
Payer: COMMERCIAL

## 2021-01-22 ENCOUNTER — APPOINTMENT (OUTPATIENT)
Dept: MAMMOGRAPHY | Facility: IMAGING CENTER | Age: 56
End: 2021-01-22
Payer: COMMERCIAL

## 2021-01-22 ENCOUNTER — OUTPATIENT (OUTPATIENT)
Dept: OUTPATIENT SERVICES | Facility: HOSPITAL | Age: 56
LOS: 1 days | End: 2021-01-22
Payer: COMMERCIAL

## 2021-01-22 ENCOUNTER — RESULT REVIEW (OUTPATIENT)
Age: 56
End: 2021-01-22

## 2021-01-22 DIAGNOSIS — Z00.8 ENCOUNTER FOR OTHER GENERAL EXAMINATION: ICD-10-CM

## 2021-01-22 DIAGNOSIS — R89.7 ABNORMAL HISTOLOGICAL FINDINGS IN SPECIMENS FROM OTHER ORGANS, SYSTEMS AND TISSUES: Chronic | ICD-10-CM

## 2021-01-22 PROCEDURE — 77063 BREAST TOMOSYNTHESIS BI: CPT | Mod: 26

## 2021-01-22 PROCEDURE — 76641 ULTRASOUND BREAST COMPLETE: CPT | Mod: 26,50

## 2021-01-22 PROCEDURE — 77063 BREAST TOMOSYNTHESIS BI: CPT

## 2021-01-22 PROCEDURE — 77067 SCR MAMMO BI INCL CAD: CPT | Mod: 26

## 2021-01-22 PROCEDURE — 76641 ULTRASOUND BREAST COMPLETE: CPT

## 2021-01-22 PROCEDURE — 77067 SCR MAMMO BI INCL CAD: CPT

## 2021-03-09 ENCOUNTER — OUTPATIENT (OUTPATIENT)
Dept: OUTPATIENT SERVICES | Facility: HOSPITAL | Age: 56
LOS: 1 days | End: 2021-03-09
Payer: COMMERCIAL

## 2021-03-09 ENCOUNTER — APPOINTMENT (OUTPATIENT)
Dept: RADIOLOGY | Facility: IMAGING CENTER | Age: 56
End: 2021-03-09
Payer: COMMERCIAL

## 2021-03-09 DIAGNOSIS — Z00.8 ENCOUNTER FOR OTHER GENERAL EXAMINATION: ICD-10-CM

## 2021-03-09 DIAGNOSIS — R89.7 ABNORMAL HISTOLOGICAL FINDINGS IN SPECIMENS FROM OTHER ORGANS, SYSTEMS AND TISSUES: Chronic | ICD-10-CM

## 2021-03-09 PROCEDURE — 77080 DXA BONE DENSITY AXIAL: CPT | Mod: 26

## 2021-03-09 PROCEDURE — 77080 DXA BONE DENSITY AXIAL: CPT

## 2021-03-12 ENCOUNTER — RESULT REVIEW (OUTPATIENT)
Age: 56
End: 2021-03-12

## 2021-04-07 ENCOUNTER — APPOINTMENT (OUTPATIENT)
Dept: ENDOCRINOLOGY | Facility: CLINIC | Age: 56
End: 2021-04-07
Payer: COMMERCIAL

## 2021-04-07 VITALS
OXYGEN SATURATION: 99 % | BODY MASS INDEX: 23.18 KG/M2 | RESPIRATION RATE: 16 BRPM | HEART RATE: 87 BPM | HEIGHT: 59 IN | SYSTOLIC BLOOD PRESSURE: 108 MMHG | DIASTOLIC BLOOD PRESSURE: 78 MMHG | TEMPERATURE: 98.6 F | WEIGHT: 115 LBS

## 2021-04-07 PROCEDURE — 99204 OFFICE O/P NEW MOD 45 MIN: CPT | Mod: 25

## 2021-04-07 PROCEDURE — 99072 ADDL SUPL MATRL&STAF TM PHE: CPT

## 2021-04-07 PROCEDURE — 36415 COLL VENOUS BLD VENIPUNCTURE: CPT

## 2021-04-14 ENCOUNTER — NON-APPOINTMENT (OUTPATIENT)
Age: 56
End: 2021-04-14

## 2021-04-22 NOTE — HISTORY OF PRESENT ILLNESS
[FreeTextEntry1] : Ms. HOOPER  is a 55 year old  female who presents for initial endocrine evaluation. She presents with regard to a history of neuroendocrine pancreatic lesion and osteoporosis. \par In February of 2019 ct abd noted a 9mm hypervascular mid pancreatic body mass In May of 2019 she underwent EUS/FNA with results c/w lesion pos for synaptophysin and chromogranin suggestive of neuroendocrine tumor with low proliferation index. The lesion has thus been felt to be c/w a early nonfunctional pancreatic neuroendocrine tumor\par \par Latest dexa scan from 03/09/2021 did show:\par T score spine -2.6\par T score femoral neck left -1.2\par T score total hip -1.5\par Risk for fracture above average. Unable to compare to previous studies due to imaging at different facilities. \par \par Previous DEXA from 2018. She was on Tamoxifen for 5 years, off  for 1 month now. She did take Vitamin D3 with Tamoxifen. \par Scan at that time  did show:\par T score spine -2.2 \par T score femoral neck left -0.8 \par \par Pt was told not to take calcium while on Tamoxifen per Dr. Herbert. \par She was put on Fosamax [Binosto] weekly, which she began 3 weeks ago. \par Currently, she is taking Citracal 1 tab in the am and 1 tab in the pm, in place of calcium for the past month. \par She does try to take calcium via diet. \par Taking Vitamin D3 1,000 iu daily. \par \par Denies hx of bony fractures. Denies long-term corticosteroid usage. She was only placed on Prednisone in the past for 5 days after reaction to radiology contrast. \par \par Menopause began around age 53. \par Additional medical history includes that of atypical breast ductal hyperplasia with hx of mult breast biopsies-followed by Dr. Obrien. Too, hx of sarcoidosis, which was revealed after a breast bx. \par \par FMHx: mother hx of osteoporosis on prolia. Too, aunt hx of osteoporosis on prolia.

## 2021-05-06 ENCOUNTER — NON-APPOINTMENT (OUTPATIENT)
Age: 56
End: 2021-05-06

## 2021-05-16 LAB
25(OH)D3 SERPL-MCNC: 53.6 NG/ML
ALBUMIN SERPL ELPH-MCNC: 4.5 G/DL
ALP BLD-CCNC: 56 U/L
ALP BONE SERPL-MCNC: 13.5 UG/L
ALT SERPL-CCNC: 6 U/L
ANION GAP SERPL CALC-SCNC: 14 MMOL/L
AST SERPL-CCNC: 24 U/L
BILIRUB SERPL-MCNC: 0.2 MG/DL
BUN SERPL-MCNC: 15 MG/DL
CALCIUM SERPL-MCNC: 9.5 MG/DL
CALCIUM SERPL-MCNC: 9.5 MG/DL
CHLORIDE SERPL-SCNC: 100 MMOL/L
CO2 SERPL-SCNC: 26 MMOL/L
COLLAGEN CTX SERPL-MCNC: 74 PG/ML
CREAT SERPL-MCNC: 0.65 MG/DL
GLUCOSE SERPL-MCNC: 95 MG/DL
MAGNESIUM SERPL-MCNC: 2.2 MG/DL
OSTEOCALCIN SERPL-MCNC: 11 NG/ML
PARATHYROID HORMONE INTACT: 33 PG/ML
PHOSPHATE SERPL-MCNC: 3 MG/DL
POTASSIUM SERPL-SCNC: 4 MMOL/L
PROT SERPL-MCNC: 7.4 G/DL
SODIUM SERPL-SCNC: 140 MMOL/L
T3FREE SERPL-MCNC: 3.06 PG/ML
T4 FREE SERPL-MCNC: 1.3 NG/DL
TSH SERPL-ACNC: 1.73 UIU/ML

## 2021-06-24 ENCOUNTER — TRANSCRIPTION ENCOUNTER (OUTPATIENT)
Age: 56
End: 2021-06-24

## 2021-07-12 ENCOUNTER — APPOINTMENT (OUTPATIENT)
Dept: ENDOCRINOLOGY | Facility: CLINIC | Age: 56
End: 2021-07-12
Payer: COMMERCIAL

## 2021-07-12 VITALS
WEIGHT: 115 LBS | HEART RATE: 86 BPM | SYSTOLIC BLOOD PRESSURE: 120 MMHG | OXYGEN SATURATION: 98 % | BODY MASS INDEX: 23.23 KG/M2 | DIASTOLIC BLOOD PRESSURE: 90 MMHG

## 2021-07-12 PROCEDURE — 99072 ADDL SUPL MATRL&STAF TM PHE: CPT

## 2021-07-12 PROCEDURE — 99214 OFFICE O/P EST MOD 30 MIN: CPT

## 2021-07-12 NOTE — HISTORY OF PRESENT ILLNESS
[FreeTextEntry1] : Ms. HOOPER  is a 55 year old  female who  returns with regard to a history of neuroendocrine pancreatic lesion and osteoporosis. \par In February of 2019 ct abd noted a 9mm hypervascular mid pancreatic body mass In May of 2019 she underwent EUS/FNA with results c/w lesion pos for synaptophysin and chromogranin suggestive of neuroendocrine tumor with low proliferation index. The lesion has thus been felt to be c/w a early nonfunctional pancreatic neuroendocrine tumor\par \par Latest dexa scan from 03/09/2021 did show:\par T score spine -2.6\par T score femoral neck left -1.2\par T score total hip -1.5\par Risk for fracture above average. Unable to compare to previous studies due to imaging at different facilities. \par \par Previous DEXA from 2018. She was on Tamoxifen for 5 years, off  for 1 month now. She did take Vitamin D3 with Tamoxifen. \par Scan at that time  did show:\par T score spine -2.2 \par T score femoral neck left -0.8 \par \par Pt was told not to take calcium while on Tamoxifen per Dr. Herbert. \par She was put on Fosamax [Binosto] weekly in March of this year and continues -tolerating well.\par Currently, she is taking Citracal 1 tab in the am and 1 tab in the pm, in place of calcium for the past month. \par She does try to take calcium via diet. \par Taking Vitamin D3 1,000 iu daily. \par \par Denies hx of bony fractures. Denies long-term corticosteroid usage. \par Menopause began around age 53. \par Additional medical history includes that of atypical breast ductal hyperplasia with hx of mult breast biopsies-followed by Dr. Obrien. Too, hx of sarcoidosis, which was revealed after a breast bx. \par  Had a swollen lymph nodes in April but tested negative for covid.\par \par FMHx: mother hx of osteoporosis on Prolia. Too, aunt hx of osteoporosis on Prolia. \par Vitamin d 25-OH level from 4/7 returned at 53.6.

## 2021-07-22 ENCOUNTER — OUTPATIENT (OUTPATIENT)
Dept: OUTPATIENT SERVICES | Facility: HOSPITAL | Age: 56
LOS: 1 days | End: 2021-07-22
Payer: COMMERCIAL

## 2021-07-22 ENCOUNTER — APPOINTMENT (OUTPATIENT)
Dept: ULTRASOUND IMAGING | Facility: IMAGING CENTER | Age: 56
End: 2021-07-22
Payer: COMMERCIAL

## 2021-07-22 ENCOUNTER — RESULT REVIEW (OUTPATIENT)
Age: 56
End: 2021-07-22

## 2021-07-22 ENCOUNTER — APPOINTMENT (OUTPATIENT)
Dept: MRI IMAGING | Facility: IMAGING CENTER | Age: 56
End: 2021-07-22
Payer: COMMERCIAL

## 2021-07-22 DIAGNOSIS — R89.7 ABNORMAL HISTOLOGICAL FINDINGS IN SPECIMENS FROM OTHER ORGANS, SYSTEMS AND TISSUES: Chronic | ICD-10-CM

## 2021-07-22 DIAGNOSIS — Z00.8 ENCOUNTER FOR OTHER GENERAL EXAMINATION: ICD-10-CM

## 2021-07-22 PROCEDURE — C8908: CPT

## 2021-07-22 PROCEDURE — 76641 ULTRASOUND BREAST COMPLETE: CPT | Mod: 26,LT

## 2021-07-22 PROCEDURE — A9585: CPT

## 2021-07-22 PROCEDURE — 76641 ULTRASOUND BREAST COMPLETE: CPT

## 2021-07-22 PROCEDURE — 77049 MRI BREAST C-+ W/CAD BI: CPT | Mod: 26

## 2021-07-22 PROCEDURE — C8937: CPT

## 2021-07-27 NOTE — REASON FOR VISIT
[Follow-Up Visit] : a follow-up visit for [Other: _____] : [unfilled] [FreeTextEntry2] : Annual follow-up examination for increased risk of breast cancer

## 2021-07-27 NOTE — PHYSICAL EXAM
[Normal] : supple, no neck mass and thyroid not enlarged [Normal Neck Lymph Nodes] : normal neck lymph nodes  [Normal Supraclavicular Lymph Nodes] : normal supraclavicular lymph nodes [Normal Groin Lymph Nodes] : normal groin lymph nodes [Normal Axillary Lymph Nodes] : normal axillary lymph nodes [Normal] : normal appearance, no rash, nodules, vesicles, ulcers, erythema [de-identified] : groins not examined [de-identified] : below

## 2021-07-27 NOTE — REVIEW OF SYSTEMS
[Negative] : Endocrine [FreeTextEntry7] : Sarcoid [FreeTextEntry8] : Pancreatic neoplasm [FreeTextEntry1] : Increased risk of breast cancer

## 2021-07-27 NOTE — ASSESSMENT
[FreeTextEntry1] : July 2020 breast MRI at 450: BI-RADS 2.\par We will repeat periodically taking into consideration her Ghada score of 20, and balancing it against the concern of cumulative gadolinium exposure..........................\par \par January 6, 2020.\par Annual, bilateral, mammogram and ultrasound at 450: BI-RADS 3.\par Right breast (3:00) six-month followup sonogram recommended.\par \par 07-06-20:\par Targeted right breast ultrasound (short-term follow-up (3:00) at 450: BI-RADS 3.\par Bilateral breast imaging due in January 2021.\par \par \par 1/22/2021:\par Bilateral mammogram and sonogram at 450: BI-RADS 3.\par Left breast (3:00) new probably benign nodule for which 6-month follow-up was recommended.\par Prescriptions entered July 21, 2020 -verified\par \par \par Clinically doing well.\par \par If no problems we will see her in another year, sooner if needed\par \par \par 01-22-21:\par Annual, bilateral, mammogram and sonogram at 450: BI-RADS 3.\par 6-month follow-up left breast sonogram recommended (attention 3:00).\par Prescription entered January 25, 2021.\par \par \par 6/15/2021:\par She called requesting submission of her prescriptions for her annual breast MRI - done. \par \par \par 6/22/21:\par Bilateral breast MRI @450: B2\par \par \par 7/22/21:\par Lt sono @450: B3\par Bilat. m/s due Jan 2022, rx entered 7/26/21

## 2021-07-27 NOTE — HISTORY OF PRESENT ILLNESS
[de-identified] : 2020, her son started his first year at [employer]\par \par \par 55 year-old lady (originally under the care of Dr. Piper) who I have seen for periodic breast examinations, since 2000.\par \par \par 2016: Right breast atypia.- DCD\par +incidental SARCOID - NO rheum eval yet\par + Tamoxifen: Dr. Will HAMLIN, Anticipated completion 2021\par \par 19:\par Seen by Dr Sarah Martinez ([facility]) for bilateral Pulmonary nodules on CT chest.\par No intervention warranted.\par Ongoing followup.\par \par She has had NINE other breast biopsies:\par (Bilateral excisional breast biopsies were benign.\par She has had 5 benign biopsies.\par A single left breast needle biopsy was also benign.\par 2013 right MRI core biopsy benign and concordant\par : Right needle biopsy (DCD)–benign)\par \par \par Ghada score= 20\par \par NO personal  history of breast OR ovarian cancer.\par \par +FH:\par A maternal aunt had breast cancer at 68\par Her maternal grandmother had breast cancer in her late 60s\par One Maternal cousins had ovarian cancer in their 50s\par \par Her own genetic testing demonstrates NO deleterious dictations.\par Since her original testing was ~, she had a followup appointment with Cristina Tucker,2019  - Unremarkable\par \par +ASHKENAZI\par \par Menarche at 13.\par , first at 32\par \par \par She returns for annual f/u.\par Asymptomatic\par This a.m., she felt an possible abnormality in the UIQ of the right breast.\par This is an area she has been aware of intermittently for the past several years - waxes and wanes w/o other sx/symp or prov/pall factors.\par No trauma/strain\par \par \par Her gynecologist is Dr. Theodore ARRINGTON\par 2020 Pap smear was normal\par 2018 she was seen by gyn-onc (Dr. Jessica Acosta) for a thickened endometrium.\par \par \par Her internist is Dr. 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 Travis VASQUEZ\par She had seen my partner, JW.\par She now follows up at [facility].\par \par \par Other relatives with a history of malignancy:\par Father  of colorectal cancer (above).\par Paternal grandmother also had colon cancer.\par Her maternal grandmother had breast cancer (above) colon cancer, and lung cancer.\par \par Her mother and a brother had melanoma.\par No personal history of skin cancer.\par She's dermatology twice a year (Dr. Angela Russell).\par \par Paternal grandfather: Lung cancer.\par Paternal uncle pancreatic cancer.\par Maternal grandfather: "Abdominal cancer"

## 2021-09-08 ENCOUNTER — TRANSCRIPTION ENCOUNTER (OUTPATIENT)
Age: 56
End: 2021-09-08

## 2021-09-09 ENCOUNTER — APPOINTMENT (OUTPATIENT)
Dept: SURGICAL ONCOLOGY | Facility: CLINIC | Age: 56
End: 2021-09-09
Payer: COMMERCIAL

## 2021-09-09 VITALS
HEART RATE: 74 BPM | BODY MASS INDEX: 23.39 KG/M2 | OXYGEN SATURATION: 96 % | RESPIRATION RATE: 16 BRPM | HEIGHT: 59 IN | SYSTOLIC BLOOD PRESSURE: 117 MMHG | WEIGHT: 116 LBS | DIASTOLIC BLOOD PRESSURE: 83 MMHG

## 2021-09-09 PROCEDURE — 99214 OFFICE O/P EST MOD 30 MIN: CPT

## 2021-09-09 NOTE — REVIEW OF SYSTEMS
[Negative] : Integumentary [FreeTextEntry7] : Sarcoid [FreeTextEntry8] : Family history of CRC, neuroendocrine tumor of the pancreas [de-identified] : Osteoporosis [FreeTextEntry1] : Increased risk of breast cancer

## 2021-09-09 NOTE — ASSESSMENT
[FreeTextEntry1] : July 2020 breast MRI at 450: BI-RADS 2.\par 6/22/21:\par Bilateral breast MRI @450: B2\par \par January 6, 2020.\par Annual, bilateral, mammogram and ultrasound at 450: BI-RADS 3.\par Right breast (3:00) six-month followup sonogram recommended.\par \par 07-06-20:\par Targeted right breast ultrasound (short-term follow-up (3:00) at 450: BI-RADS 3.\par \par \par 1/22/2021:\par Bilateral mammogram and sonogram at 450: BI-RADS 3.\par Left breast (3:00) new probably benign nodule for which 6-month follow-up was recommended.\par \par \par 7/22/2021:\par Left breast sonogram at 450: BI-RADS 3.\par \par Prescription for her January 2022 bilateral mammogram and ultrasound verified Today.\par \par \par Clinically doing well.\par \par If no problems we will see her in another year, sooner if needed\par

## 2021-09-09 NOTE — REASON FOR VISIT
[Follow-Up Visit] : a follow-up visit for [Other: _____] : [unfilled] [FreeTextEntry2] : Increased risk of breast cancer, annual breast examination.

## 2021-09-09 NOTE — HISTORY OF PRESENT ILLNESS
[de-identified] : 2020, her son started his first year at [employer]\par \par \par 56 year-old lady (originally under the care of Dr. Piper) who I have seen for periodic breast examinations, since 2000.\par \par \par 2016: Right breast atypia.- DCD\par +incidental SARCOID - NO rheum eval yet\par + Tamoxifen: Dr. Will HAMLIN, completed 2021\par \par 19:\par Seen by Dr Sarah Martinez ([facility]) for bilateral Pulmonary nodules on CT chest.\par No intervention warranted.\par Ongoing followup.\par \par She has had NINE other breast biopsies:\par (Bilateral excisional breast biopsies were benign.\par She has had 5 right breast benign biopsies.\par A single left breast needle biopsy was also benign.\par 2013 right MRI core biopsy benign and concordant\par : Right needle biopsy (DCD)–benign)\par \par \par Ghada score= 20\par \par NO personal  history of breast OR ovarian cancer.\par \par +FH:\par A maternal aunt had breast cancer at 68\par Her maternal grandmother had breast cancer in her late 60s\par One Maternal cousins had ovarian cancer in their 50s\par \par +ASHKENAZI.\par \par \par Other family history of malignancy:\par Father: CRC\par Paternal grandmother also had colon cancer.\par Her maternal grandmother had breast cancer (above) colon cancer, and lung cancer.\par \par Her mother AND a brother had melanoma.\par \par Paternal grandfather: Lung cancer.\par Paternal uncle pancreatic cancer.\par Maternal grandfather: "Abdominal cancer"\par \par \par Her own genetic testing demonstrates NO deleterious dictations.\par Since her original testing was ~, she had a followup appointment with Cristina Tucker,2019  - Unremarkable\par \par \par Menarche at 13.\par , first at 32\par \par \par She returns for annual f/u.\par \par \par 2020:\par The morning of her visit with me, she felt an possible abnormality in the UIQ of the right breast.\par This was an area she has been aware of intermittently for the past several years.\par It waxes and wanes w/o other sx/symp or prov/pall factors.\par No trauma/strain.\par \par My PE:\par No discrete visible or palpable abnormality.\par Her area of concern was right breast, 1:00, ~4FBFN\par \par \par Her internist is Dr. Carmen IRWIN\par She used to see Dr. Arnoldo Isabel.  He left the practice.\par \par No pacemaker or defibrillator.\par No anticoagulants.\par \par She completed a 5-year course of tamoxifen, for risk reduction due to the diagnosis of atypia, in .\par \par + Osteoporosis.\par Endocrinology: Dr.Barry NASH.\par Treated with alendronate, vitamin D, and calcium\par \par \par Her gynecologist is Dr. Theodore ARRINGTON\par 2021 Pap smear was normal\par 2018 she was seen by gyn-onc (Dr. 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 Travis VASQUEZ\par She had seen my partner, JW.\par She now follows up at [facility].\par \par \par Other relatives with a history of malignancy:\par Father  of colorectal cancer (above).\par Paternal grandmother also had colon cancer.\par Her maternal grandmother had breast cancer (above) colon cancer, and lung cancer.\par \par Her mother and a brother had melanoma.\par No personal history of skin cancer.\par She's dermatology twice a year (Dr. Angela Russell).\par \par Paternal grandfather: Lung cancer.\par Paternal uncle pancreatic cancer.\par Maternal grandfather: "Abdominal cancer"

## 2021-09-09 NOTE — PHYSICAL EXAM
[Normal] : supple, no neck mass and thyroid not enlarged [Normal Neck Lymph Nodes] : normal neck lymph nodes  [Normal Supraclavicular Lymph Nodes] : normal supraclavicular lymph nodes [Normal Groin Lymph Nodes] : normal groin lymph nodes [Normal Axillary Lymph Nodes] : normal axillary lymph nodes [Normal] : normal appearance, no rash, nodules, vesicles, ulcers, erythema [de-identified] : groins not examined [de-identified] : below

## 2021-12-10 ENCOUNTER — TRANSCRIPTION ENCOUNTER (OUTPATIENT)
Age: 56
End: 2021-12-10

## 2022-01-12 ENCOUNTER — APPOINTMENT (OUTPATIENT)
Dept: ENDOCRINOLOGY | Facility: CLINIC | Age: 57
End: 2022-01-12
Payer: COMMERCIAL

## 2022-01-12 ENCOUNTER — LABORATORY RESULT (OUTPATIENT)
Age: 57
End: 2022-01-12

## 2022-01-12 ENCOUNTER — RESULT REVIEW (OUTPATIENT)
Age: 57
End: 2022-01-12

## 2022-01-12 VITALS
HEART RATE: 79 BPM | TEMPERATURE: 98.6 F | SYSTOLIC BLOOD PRESSURE: 118 MMHG | DIASTOLIC BLOOD PRESSURE: 62 MMHG | OXYGEN SATURATION: 99 % | WEIGHT: 117 LBS | BODY MASS INDEX: 23.63 KG/M2 | RESPIRATION RATE: 15 BRPM

## 2022-01-12 PROCEDURE — 36415 COLL VENOUS BLD VENIPUNCTURE: CPT

## 2022-01-12 PROCEDURE — 99214 OFFICE O/P EST MOD 30 MIN: CPT | Mod: 25

## 2022-01-12 NOTE — HISTORY OF PRESENT ILLNESS
[FreeTextEntry1] : Ms. HOOPER  is a 56 year old  female who  returns with regard to a history of neuroendocrine pancreatic lesion and osteoporosis. \par In February of 2019 ct abd noted a 9mm hypervascular mid pancreatic body mass In May of 2019 she underwent EUS/FNA with results c/w lesion pos for synaptophysin and chromogranin suggestive of neuroendocrine tumor with low proliferation index. The lesion has thus been felt to be c/w a early nonfunctional pancreatic neuroendocrine tumor. Her recent Chromogranin A returned high and upon repeat in 4-6 weeks returned high again. \par \par Latest dexa scan from 03/09/2021 did show:\par T score spine -2.6\par T score femoral neck left -1.2\par T score total hip -1.5\par Risk for fracture above average. Unable to compare to previous studies due to imaging at different facilities. \par \par Previous DEXA from 2018. She was on Tamoxifen for 5 years, off  for 1 month now. She did take Vitamin D3 with Tamoxifen. \par Scan at that time  did show:\par T score spine -2.2 \par T score femoral neck left -0.8 \par \par Pt was told not to take calcium while on Tamoxifen per Dr. Herbert. \par She was put on Fosamax [Binosto] weekly in March of this year and continues -tolerating well.\par Currently, she is taking Citracal 1 tab in the am and 1 tab in the pm, in place of calcium for the past month. \par She does try to take calcium via diet. \par Taking Vitamin D3 1,000 iu daily. \par \par Denies hx of bony fractures. Denies long-term corticosteroid usage. \par Menopause began around age 53. \par Additional medical history includes that of atypical breast ductal hyperplasia with hx of mult breast biopsies-followed by Dr. Obrien. Too, hx of sarcoidosis, which was revealed after a breast bx. \par Off Tomaxafen and on Alendronate weekly.\par FMHx: mother hx of osteoporosis on Prolia. Too, aunt hx of osteoporosis on Prolia. \par Vitamin d 25-OH level from 5/16 returned at 53.6.

## 2022-01-13 LAB
24R-OH-CALCIDIOL SERPL-MCNC: 119 PG/ML
25(OH)D3 SERPL-MCNC: 51.2 NG/ML
ALBUMIN SERPL ELPH-MCNC: 4.4 G/DL
ALP BLD-CCNC: 44 U/L
ALT SERPL-CCNC: 8 U/L
ANION GAP SERPL CALC-SCNC: 11 MMOL/L
AST SERPL-CCNC: 24 U/L
BILIRUB SERPL-MCNC: 0.4 MG/DL
BUN SERPL-MCNC: 12 MG/DL
CA-I SERPL-SCNC: 1.27 MMOL/L
CALCIUM SERPL-MCNC: 9.4 MG/DL
CALCIUM SERPL-MCNC: 9.4 MG/DL
CHLORIDE SERPL-SCNC: 104 MMOL/L
CO2 SERPL-SCNC: 24 MMOL/L
CREAT SERPL-MCNC: 0.73 MG/DL
GLUCOSE SERPL-MCNC: 85 MG/DL
MAGNESIUM SERPL-MCNC: 2.2 MG/DL
PARATHYROID HORMONE INTACT: 20 PG/ML
PHOSPHATE SERPL-MCNC: 2.9 MG/DL
POTASSIUM SERPL-SCNC: 4.3 MMOL/L
PROT SERPL-MCNC: 7.1 G/DL
SODIUM SERPL-SCNC: 140 MMOL/L
T3FREE SERPL-MCNC: 2.96 PG/ML
T4 FREE SERPL-MCNC: 1.3 NG/DL
TSH SERPL-ACNC: 1.86 UIU/ML

## 2022-01-18 LAB
ALP BONE SERPL-MCNC: 7.8 UG/L
COLLAGEN CTX SERPL-MCNC: 94 PG/ML
OSTEOCALCIN SERPL-MCNC: 7.2 NG/ML

## 2022-01-24 ENCOUNTER — APPOINTMENT (OUTPATIENT)
Dept: MAMMOGRAPHY | Facility: IMAGING CENTER | Age: 57
End: 2022-01-24
Payer: COMMERCIAL

## 2022-01-24 ENCOUNTER — OUTPATIENT (OUTPATIENT)
Dept: OUTPATIENT SERVICES | Facility: HOSPITAL | Age: 57
LOS: 1 days | End: 2022-01-24
Payer: COMMERCIAL

## 2022-01-24 ENCOUNTER — RESULT REVIEW (OUTPATIENT)
Age: 57
End: 2022-01-24

## 2022-01-24 ENCOUNTER — APPOINTMENT (OUTPATIENT)
Dept: ULTRASOUND IMAGING | Facility: IMAGING CENTER | Age: 57
End: 2022-01-24
Payer: COMMERCIAL

## 2022-01-24 DIAGNOSIS — R89.7 ABNORMAL HISTOLOGICAL FINDINGS IN SPECIMENS FROM OTHER ORGANS, SYSTEMS AND TISSUES: Chronic | ICD-10-CM

## 2022-01-24 DIAGNOSIS — Z00.8 ENCOUNTER FOR OTHER GENERAL EXAMINATION: ICD-10-CM

## 2022-01-24 PROCEDURE — 77067 SCR MAMMO BI INCL CAD: CPT

## 2022-01-24 PROCEDURE — 76641 ULTRASOUND BREAST COMPLETE: CPT

## 2022-01-24 PROCEDURE — 77063 BREAST TOMOSYNTHESIS BI: CPT | Mod: 26

## 2022-01-24 PROCEDURE — 77063 BREAST TOMOSYNTHESIS BI: CPT

## 2022-01-24 PROCEDURE — 76641 ULTRASOUND BREAST COMPLETE: CPT | Mod: 26,50

## 2022-01-24 PROCEDURE — 77067 SCR MAMMO BI INCL CAD: CPT | Mod: 26

## 2022-02-05 ENCOUNTER — TRANSCRIPTION ENCOUNTER (OUTPATIENT)
Age: 57
End: 2022-02-05

## 2022-02-27 ENCOUNTER — TRANSCRIPTION ENCOUNTER (OUTPATIENT)
Age: 57
End: 2022-02-27

## 2022-03-28 ENCOUNTER — RESULT REVIEW (OUTPATIENT)
Age: 57
End: 2022-03-28

## 2022-03-28 ENCOUNTER — APPOINTMENT (OUTPATIENT)
Dept: OBGYN | Facility: CLINIC | Age: 57
End: 2022-03-28
Payer: COMMERCIAL

## 2022-03-28 PROCEDURE — 36415 COLL VENOUS BLD VENIPUNCTURE: CPT

## 2022-03-28 PROCEDURE — 99396 PREV VISIT EST AGE 40-64: CPT | Mod: 25

## 2022-03-28 PROCEDURE — 82270 OCCULT BLOOD FECES: CPT

## 2022-03-28 PROCEDURE — 76830 TRANSVAGINAL US NON-OB: CPT

## 2022-03-28 PROCEDURE — 81002 URINALYSIS NONAUTO W/O SCOPE: CPT | Mod: 59

## 2022-07-01 ENCOUNTER — APPOINTMENT (OUTPATIENT)
Dept: ENDOCRINOLOGY | Facility: CLINIC | Age: 57
End: 2022-07-01

## 2022-07-01 VITALS
TEMPERATURE: 98.6 F | OXYGEN SATURATION: 99 % | RESPIRATION RATE: 15 BRPM | DIASTOLIC BLOOD PRESSURE: 68 MMHG | SYSTOLIC BLOOD PRESSURE: 114 MMHG | HEART RATE: 82 BPM | BODY MASS INDEX: 22.62 KG/M2 | WEIGHT: 112 LBS

## 2022-07-01 DIAGNOSIS — Z86.2 PERSONAL HISTORY OF DISEASES OF THE BLOOD AND BLOOD-FORMING ORGANS AND CERTAIN DISORDERS INVOLVING THE IMMUNE MECHANISM: ICD-10-CM

## 2022-07-01 PROCEDURE — 99214 OFFICE O/P EST MOD 30 MIN: CPT | Mod: 25

## 2022-07-01 PROCEDURE — 36415 COLL VENOUS BLD VENIPUNCTURE: CPT

## 2022-07-05 LAB
25(OH)D3 SERPL-MCNC: 70.2 NG/ML
ALBUMIN SERPL ELPH-MCNC: 4.7 G/DL
ALP BLD-CCNC: 45 U/L
ALT SERPL-CCNC: 6 U/L
ANION GAP SERPL CALC-SCNC: 16 MMOL/L
AST SERPL-CCNC: 25 U/L
BILIRUB SERPL-MCNC: 0.3 MG/DL
BUN SERPL-MCNC: 15 MG/DL
CALCIUM SERPL-MCNC: 9.7 MG/DL
CALCIUM SERPL-MCNC: 9.7 MG/DL
CHLORIDE SERPL-SCNC: 103 MMOL/L
CO2 SERPL-SCNC: 24 MMOL/L
CREAT SERPL-MCNC: 0.7 MG/DL
EGFR: 102 ML/MIN/1.73M2
FOLATE SERPL-MCNC: 15.2 NG/ML
GLUCOSE SERPL-MCNC: 96 MG/DL
MAGNESIUM SERPL-MCNC: 2.3 MG/DL
OSTEOCALCIN SERPL-MCNC: 6.2 NG/ML
PARATHYROID HORMONE INTACT: 22 PG/ML
PHOSPHATE SERPL-MCNC: 3.1 MG/DL
POTASSIUM SERPL-SCNC: 4.1 MMOL/L
PROT SERPL-MCNC: 7.5 G/DL
SODIUM SERPL-SCNC: 143 MMOL/L
VIT B12 SERPL-MCNC: 457 PG/ML

## 2022-07-06 ENCOUNTER — NON-APPOINTMENT (OUTPATIENT)
Age: 57
End: 2022-07-06

## 2022-07-06 LAB — ALP BONE SERPL-MCNC: 7.4 UG/L

## 2022-07-06 NOTE — HISTORY OF PRESENT ILLNESS
[FreeTextEntry1] : Ms. HOOPER is a 56 year old female who returns with regard to a history of osteoporosis. Too with hx of neuroendocrine pancreatic lesion.\par She continues to follow with Gregory HDEZ - her pancreatic oncologist. and  Dr. White.\par \par Latest dexa scan from 03/09/2021 did show:\par T score spine -2.6\par T score femoral neck left -1.2\par T score total hip -1.5\par . Unable to compare to previous studies due to imaging at different facilities. \par \par Previous DEXA from 2018. She was on Tamoxifen for 5 years,  She did take Vitamin D3 with Tamoxifen. \par Scan at that time did show:\par T score spine -2.2 \par T score femoral neck left -0.8 \par \par \par She was put on Fosamax weekly in March of 2021 and continues -tolerating well.\par Currently, she is taking Citracal 1 tab in the am\par She does try to take calcium via diet. \par Taking Vitamin D3 1,000 iu daily. \par \par denies any bone fractures. \par Denies long-term corticosteroid usage. \par Menopause began around age 53. \par \par Additional medical history includes that of atypical breast ductal hyperplasia with hx of multiple  breast biopsies-followed by Dr. Obrien. Too, hx of sarcoidosis, which was revealed after a breast bx. \par \par FMHx: mother hx of osteoporosis on Prolia. Too, aunt hx of osteoporosis on Prolia. \par

## 2022-07-07 LAB — COLLAGEN CTX SERPL-MCNC: 57 PG/ML

## 2022-07-29 ENCOUNTER — OUTPATIENT (OUTPATIENT)
Dept: OUTPATIENT SERVICES | Facility: HOSPITAL | Age: 57
LOS: 1 days | End: 2022-07-29
Payer: COMMERCIAL

## 2022-07-29 ENCOUNTER — APPOINTMENT (OUTPATIENT)
Dept: MRI IMAGING | Facility: IMAGING CENTER | Age: 57
End: 2022-07-29

## 2022-07-29 DIAGNOSIS — N60.91 UNSPECIFIED BENIGN MAMMARY DYSPLASIA OF RIGHT BREAST: ICD-10-CM

## 2022-07-29 DIAGNOSIS — R89.7 ABNORMAL HISTOLOGICAL FINDINGS IN SPECIMENS FROM OTHER ORGANS, SYSTEMS AND TISSUES: Chronic | ICD-10-CM

## 2022-07-29 PROCEDURE — C8908: CPT

## 2022-07-29 PROCEDURE — A9585: CPT

## 2022-07-29 PROCEDURE — 77049 MRI BREAST C-+ W/CAD BI: CPT | Mod: 26

## 2022-07-29 PROCEDURE — C8937: CPT

## 2022-09-12 ENCOUNTER — APPOINTMENT (OUTPATIENT)
Dept: SURGICAL ONCOLOGY | Facility: CLINIC | Age: 57
End: 2022-09-12

## 2022-09-12 VITALS
HEIGHT: 59 IN | HEART RATE: 81 BPM | SYSTOLIC BLOOD PRESSURE: 119 MMHG | DIASTOLIC BLOOD PRESSURE: 81 MMHG | RESPIRATION RATE: 16 BRPM | BODY MASS INDEX: 22.82 KG/M2 | TEMPERATURE: 97.5 F | OXYGEN SATURATION: 98 %

## 2022-09-12 VITALS — BODY MASS INDEX: 22.82 KG/M2 | WEIGHT: 113 LBS

## 2022-09-12 PROCEDURE — 99214 OFFICE O/P EST MOD 30 MIN: CPT

## 2022-09-12 RX ORDER — COVID-19 ANTIGEN TEST
KIT MISCELLANEOUS
Qty: 8 | Refills: 0 | Status: ACTIVE | COMMUNITY
Start: 2022-07-12

## 2022-09-13 NOTE — REASON FOR VISIT
[Follow-Up Visit] : a follow-up visit for [Other: _____] : [unfilled] [FreeTextEntry2] : Annual breast exam, Ghada risk score = 20

## 2022-09-13 NOTE — REVIEW OF SYSTEMS
[Negative] : Integumentary [FreeTextEntry8] : Benign pancreatic neoplasm [de-identified] : Osteoporosis [FreeTextEntry1] : Increased risk of breast cancer

## 2022-09-13 NOTE — HISTORY OF PRESENT ILLNESS
[de-identified] : 2020, her son started his first year at [employer]\par \par \par 57 year-old lady (originally under the care of Dr. Piper) who I have seen for periodic breast examinations, since 2000.\par \par \par 2016: Right breast atypia.- DCD\par +incidental SARCOID - NO rheum eval yet\par + Tamoxifen: Dr. Will HAMLIN, completed 2021\par \par 19:\par Seen by Dr Sarah Martinez ([facility]) for bilateral Pulmonary nodules on CT chest.\par No intervention warranted.\par Ongoing followup.\par \par She has had NINE other breast biopsies:\par (Bilateral excisional breast biopsies were benign.\par She has had 5 right breast benign biopsies.\par A single left breast needle biopsy was also benign.\par 2013 right MRI core biopsy benign and concordant\par : Right needle biopsy (DCD)–benign)\par \par \par Ghada score= 20\par \par NO personal  history of breast OR ovarian cancer.\par \par +FH:\par A maternal aunt had breast cancer at 68\par Her maternal grandmother had breast cancer in her late 60s\par One Maternal cousins had ovarian cancer in their 50s\par \par +ASHKENAZI.\par \par \par Other family history of malignancy:\par Father: CRC\par Paternal grandmother also had colon cancer.\par Her maternal grandmother had breast cancer (above) colon cancer, and lung cancer.\par \par Her mother AND a brother had melanoma.\par \par Paternal grandfather: Lung cancer.\par Paternal uncle pancreatic cancer.\par Maternal grandfather: "Abdominal cancer"\par \par \par Her own genetic testing demonstrates NO deleterious dictations.\par Since her original testing was ~, she had a followup appointment with Cristina Tucker,2019  - Unremarkable\par \par \par Menarche at 13.\par , first at 32\par \par \par She returns for annual f/u.\par \par \par 2020:\par The morning of her visit with me, she felt an possible abnormality in the UIQ of the right breast.\par This was an area she has been aware of intermittently for the past several years.\par It waxes and wanes w/o other sx/symp or prov/pall factors.\par No trauma/strain.\par \par My PE:\par No discrete visible or palpable abnormality.\par Her area of concern was right breast, 1:00, ~4FBFN\par \par \par She currently does not have an internist.\par She used to see Dr. Arnoldo Isabel, and then Dr. Korey Rivas\par \par No pacemaker or defibrillator.\par No anticoagulants.\par \par She completed a 5-year course of tamoxifen, for risk reduction due to the diagnosis of atypia, in .\par \par + Osteoporosis.\par Endocrinology: Dr.Barry NASH.\par Treated with alendronate, vitamin D, and calcium\par \par \par Her gynecologist is Dr. Theodore ARRINGTON\par 2021 Pap smear was normal\par 2018 she was seen by gyn-onc (Dr. Jessica Acosta) for a thickened endometrium.\par \par \par Colonoscopy: 2022 with DR Wilberto TINAJERO, ok x 5 yr. (............... 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 Travis Murry, NOW DR Tamas GONDA\par She had seen my partner, JW.\par She now follows up at [facility].\par \par \par Other relatives with a history of malignancy:\par Father  of colorectal cancer (above).\par Paternal grandmother also had colon cancer.\par Her maternal grandmother had breast cancer (above) colon cancer, and lung cancer.\par \par Her mother and a brother had melanoma.\par No personal history of skin cancer.\par She's dermatology twice a year (Dr. Angela Russell).\par \par Paternal grandfather: Lung cancer.\par Paternal uncle pancreatic cancer.\par Maternal grandfather: "Abdominal cancer"

## 2022-09-13 NOTE — PHYSICAL EXAM
[Normal] : supple, no neck mass and thyroid not enlarged [Normal Neck Lymph Nodes] : normal neck lymph nodes  [Normal Supraclavicular Lymph Nodes] : normal supraclavicular lymph nodes [Normal Groin Lymph Nodes] : normal groin lymph nodes [Normal Axillary Lymph Nodes] : normal axillary lymph nodes [Normal] : normal appearance, no rash, nodules, vesicles, ulcers, erythema [de-identified] : groins not examined [de-identified] : below

## 2022-09-13 NOTE — ASSESSMENT
[FreeTextEntry1] : July 2020 breast MRI at 450: BI-RADS 2.\par 6/22/21:\par Bilateral breast MRI @450: B2.\par We will repeat periodically balancing her increased risk of breast cancer against the concern of cumulative gadolinium exposure.......................\par Called\par \par January 2022:\par Bilateral mammogram and sonogram at 450: Okay x1 year, despite BI-RADS 3 designation.\par Prescription entered for January 2023\par \par \par Clinically doing well.\par \par If no problems we will see her in another year, sooner if needed\par

## 2022-10-31 ENCOUNTER — NON-APPOINTMENT (OUTPATIENT)
Age: 57
End: 2022-10-31

## 2022-11-01 ENCOUNTER — APPOINTMENT (OUTPATIENT)
Dept: ENDOCRINOLOGY | Facility: CLINIC | Age: 57
End: 2022-11-01

## 2022-11-01 VITALS
HEART RATE: 85 BPM | BODY MASS INDEX: 22.83 KG/M2 | DIASTOLIC BLOOD PRESSURE: 80 MMHG | HEIGHT: 59 IN | WEIGHT: 113.25 LBS | TEMPERATURE: 98.2 F | SYSTOLIC BLOOD PRESSURE: 122 MMHG | OXYGEN SATURATION: 98 %

## 2022-11-01 DIAGNOSIS — D3A.8 OTHER BENIGN NEUROENDOCRINE TUMORS: ICD-10-CM

## 2022-11-01 PROCEDURE — 99214 OFFICE O/P EST MOD 30 MIN: CPT | Mod: 25

## 2022-11-01 PROCEDURE — 36415 COLL VENOUS BLD VENIPUNCTURE: CPT

## 2022-11-02 LAB
25(OH)D3 SERPL-MCNC: 48.7 NG/ML
ALBUMIN SERPL ELPH-MCNC: 4.5 G/DL
ALP BLD-CCNC: 52 U/L
ALT SERPL-CCNC: 8 U/L
ANION GAP SERPL CALC-SCNC: 13 MMOL/L
AST SERPL-CCNC: 22 U/L
BILIRUB SERPL-MCNC: 0.4 MG/DL
BUN SERPL-MCNC: 12 MG/DL
CALCIUM SERPL-MCNC: 10 MG/DL
CALCIUM SERPL-MCNC: 10 MG/DL
CHLORIDE SERPL-SCNC: 102 MMOL/L
CO2 SERPL-SCNC: 26 MMOL/L
CREAT SERPL-MCNC: 0.69 MG/DL
EGFR: 102 ML/MIN/1.73M2
GLUCOSE SERPL-MCNC: 83 MG/DL
MAGNESIUM SERPL-MCNC: 2.1 MG/DL
PARATHYROID HORMONE INTACT: 11 PG/ML
PHOSPHATE SERPL-MCNC: 3.9 MG/DL
POTASSIUM SERPL-SCNC: 4.6 MMOL/L
PROT SERPL-MCNC: 6.9 G/DL
SODIUM SERPL-SCNC: 141 MMOL/L

## 2022-11-03 LAB — CA-I SERPL-SCNC: 5.2 MG/DL

## 2022-11-04 LAB — OSTEOCALCIN SERPL-MCNC: 6.5 NG/ML

## 2022-11-06 NOTE — HISTORY OF PRESENT ILLNESS
[FreeTextEntry1] : Ms. HOOPER is a 57 year old female who returns with regard to a history of osteoporosis. Too, with hx of neuroendocrine pancreatic lesion.\par She continues to follow with Dr. Cindy HDEZ - her pancreatic oncologist. Had Pet scan in July 2022 and was stable. Will repeat in 1 year. \par \par Latest dexa scan from 03/09/2021 did show:\par T score spine -2.6\par T score femoral neck left -1.2\par T score total hip -1.5\par Unable to compare to previous studies due to imaging at different facilities. \par \par Previous DEXA from 2018. She was on Tamoxifen for 5 years, She did take Vitamin D3 with Tamoxifen. \par Scan at that time did show:\par T score spine -2.2 \par T score femoral neck left -0.8 \par \par She was put on Fosamax weekly in March of 2021 and continues -tolerating well. No adverse effects. \par Currently, she is taking Citracal 1 tab in the am\par She does try to take calcium via diet. \par Taking Vitamin D3 1,000 iu daily. \par \par denies any bone fractures. \par Denies long-term corticosteroid usage. \par Menopause began around age 53. \par \par Additional medical history includes that of atypical breast ductal hyperplasia with hx of multiple breast biopsies-followed by Dr. Obrien. She stated that she was told that if her upcoming Mamogram in January 2022 is normal, she does not need MRI. \par \par Too, hx of sarcoidosis following Dr. Romeo and has f/u end of November 2022 , which was revealed after a breast bx. \par \par FMHx: mother hx of osteoporosis on Prolia. Too, aunt hx of osteoporosis on Prolia. \par

## 2022-11-07 LAB
ALP BONE SERPL-MCNC: 8.7 UG/L
COLLAGEN CTX SERPL-MCNC: 95 PG/ML

## 2022-11-09 ENCOUNTER — NON-APPOINTMENT (OUTPATIENT)
Age: 57
End: 2022-11-09

## 2022-11-15 ENCOUNTER — NON-APPOINTMENT (OUTPATIENT)
Age: 57
End: 2022-11-15

## 2023-01-26 ENCOUNTER — APPOINTMENT (OUTPATIENT)
Dept: ULTRASOUND IMAGING | Facility: IMAGING CENTER | Age: 58
End: 2023-01-26
Payer: COMMERCIAL

## 2023-01-26 ENCOUNTER — OUTPATIENT (OUTPATIENT)
Dept: OUTPATIENT SERVICES | Facility: HOSPITAL | Age: 58
LOS: 1 days | End: 2023-01-26
Payer: COMMERCIAL

## 2023-01-26 ENCOUNTER — RESULT REVIEW (OUTPATIENT)
Age: 58
End: 2023-01-26

## 2023-01-26 ENCOUNTER — APPOINTMENT (OUTPATIENT)
Dept: MAMMOGRAPHY | Facility: IMAGING CENTER | Age: 58
End: 2023-01-26
Payer: COMMERCIAL

## 2023-01-26 DIAGNOSIS — R89.7 ABNORMAL HISTOLOGICAL FINDINGS IN SPECIMENS FROM OTHER ORGANS, SYSTEMS AND TISSUES: Chronic | ICD-10-CM

## 2023-01-26 DIAGNOSIS — Z00.8 ENCOUNTER FOR OTHER GENERAL EXAMINATION: ICD-10-CM

## 2023-01-26 PROCEDURE — 76641 ULTRASOUND BREAST COMPLETE: CPT | Mod: 26,50

## 2023-01-26 PROCEDURE — 77067 SCR MAMMO BI INCL CAD: CPT | Mod: 26

## 2023-01-26 PROCEDURE — 77063 BREAST TOMOSYNTHESIS BI: CPT | Mod: 26

## 2023-01-26 PROCEDURE — 77063 BREAST TOMOSYNTHESIS BI: CPT

## 2023-01-26 PROCEDURE — 77067 SCR MAMMO BI INCL CAD: CPT

## 2023-01-26 PROCEDURE — 76641 ULTRASOUND BREAST COMPLETE: CPT

## 2023-03-22 ENCOUNTER — APPOINTMENT (OUTPATIENT)
Dept: RADIOLOGY | Facility: IMAGING CENTER | Age: 58
End: 2023-03-22
Payer: COMMERCIAL

## 2023-03-22 ENCOUNTER — OUTPATIENT (OUTPATIENT)
Dept: OUTPATIENT SERVICES | Facility: HOSPITAL | Age: 58
LOS: 1 days | End: 2023-03-22
Payer: COMMERCIAL

## 2023-03-22 DIAGNOSIS — E55.9 VITAMIN D DEFICIENCY, UNSPECIFIED: ICD-10-CM

## 2023-03-22 DIAGNOSIS — R89.7 ABNORMAL HISTOLOGICAL FINDINGS IN SPECIMENS FROM OTHER ORGANS, SYSTEMS AND TISSUES: Chronic | ICD-10-CM

## 2023-03-22 DIAGNOSIS — D3A.8 OTHER BENIGN NEUROENDOCRINE TUMORS: ICD-10-CM

## 2023-03-22 DIAGNOSIS — M81.0 AGE-RELATED OSTEOPOROSIS WITHOUT CURRENT PATHOLOGICAL FRACTURE: ICD-10-CM

## 2023-03-22 PROCEDURE — 77080 DXA BONE DENSITY AXIAL: CPT

## 2023-03-22 PROCEDURE — 77080 DXA BONE DENSITY AXIAL: CPT | Mod: 26

## 2023-03-28 ENCOUNTER — APPOINTMENT (OUTPATIENT)
Dept: ENDOCRINOLOGY | Facility: CLINIC | Age: 58
End: 2023-03-28
Payer: COMMERCIAL

## 2023-03-28 VITALS
TEMPERATURE: 98.4 F | DIASTOLIC BLOOD PRESSURE: 78 MMHG | BODY MASS INDEX: 22.84 KG/M2 | WEIGHT: 113.31 LBS | HEART RATE: 75 BPM | SYSTOLIC BLOOD PRESSURE: 118 MMHG | HEIGHT: 59 IN | OXYGEN SATURATION: 99 %

## 2023-03-28 PROCEDURE — 99214 OFFICE O/P EST MOD 30 MIN: CPT | Mod: 25

## 2023-03-28 PROCEDURE — 36415 COLL VENOUS BLD VENIPUNCTURE: CPT

## 2023-03-29 LAB
25(OH)D3 SERPL-MCNC: 47.7 NG/ML
ALBUMIN SERPL ELPH-MCNC: 4.8 G/DL
ALP BLD-CCNC: 46 U/L
ALT SERPL-CCNC: 5 U/L
ANION GAP SERPL CALC-SCNC: 14 MMOL/L
AST SERPL-CCNC: 22 U/L
BILIRUB SERPL-MCNC: 0.4 MG/DL
BUN SERPL-MCNC: 13 MG/DL
CALCIUM SERPL-MCNC: 10.4 MG/DL
CALCIUM SERPL-MCNC: 10.4 MG/DL
CHLORIDE SERPL-SCNC: 100 MMOL/L
CO2 SERPL-SCNC: 26 MMOL/L
CREAT SERPL-MCNC: 0.71 MG/DL
EGFR: 100 ML/MIN/1.73M2
GLUCOSE SERPL-MCNC: 74 MG/DL
MAGNESIUM SERPL-MCNC: 2.2 MG/DL
PARATHYROID HORMONE INTACT: 20 PG/ML
PHOSPHATE SERPL-MCNC: 3.6 MG/DL
POTASSIUM SERPL-SCNC: 4.2 MMOL/L
PROT SERPL-MCNC: 7.7 G/DL
SODIUM SERPL-SCNC: 140 MMOL/L
T3FREE SERPL-MCNC: 2.61 PG/ML
T4 FREE SERPL-MCNC: 1.3 NG/DL
TSH SERPL-ACNC: 1.84 UIU/ML

## 2023-03-31 LAB — OSTEOCALCIN SERPL-MCNC: 5.1 NG/ML

## 2023-04-03 LAB
ALP BONE SERPL-MCNC: 7.3 UG/L
COLLAGEN CTX SERPL-MCNC: 106 PG/ML

## 2023-04-07 NOTE — HISTORY OF PRESENT ILLNESS
[FreeTextEntry1] : Ms. HOOPER is a 57 year old female who returns with regard to a history of osteoporosis. Too, with hx of neuroendocrine pancreatic lesion.\par She continues to follow with Dr. Cindy HDEZ - her pancreatic oncologist. Had Pet scan in July 2022 and was stable. Will repeat in 1 year. \par \par Too follows with Dr. Gonzalez watching Chromogranin  levels\par Too follows with Dr. Millard re Sarcoidosis\par \par Latest dexa scan from 03/22/2023  did show:\par T score spine  -2.3    up 4.5 % from 03/09/2021\par T score femoral neck left-1.6\par T score total hip  -1.4 No change from prior\par \par \par Prior dexa scan from 03/09/2021 did show:\par T score spine -2.6\par T score femoral neck left -1.2\par T score total hip -1.5\par \par \par  She was on Tamoxifen for 5 years Too, has been taking Vitamin D3 \par \par \par She was put on Fosamax weekly in March of 2021 and continues -tolerating well. No adverse effects. \par Currently, she is taking Citracal 1 tab in the am\par She does try to take calcium via diet. \par Taking Vitamin D3 1,000 iu daily. \par \par \par FH  Mom and maternal aunt with significant Osteoporosis\par \par denies any bone fractures. \par Denies long-term corticosteroid usage. \par Menopause began around age 53. \par \par Additional medical history includes that of atypical breast ductal hyperplasia with hx of multiple breast biopsies-followed by Dr. Obrien. She stated that she was told that if her upcoming Mamogram in January 2022 is normal, she does not need MRI. \par \par Walks over 4 miles daily\par \par Too, hx of sarcoidosis following Dr. Romeo and has f/u end of November 2022 , which was revealed after a breast bx. \par \par FMHx: mother hx of osteoporosis on Prolia. Too, aunt hx of osteoporosis on Prolia. \par

## 2023-04-11 ENCOUNTER — APPOINTMENT (OUTPATIENT)
Dept: OBGYN | Facility: CLINIC | Age: 58
End: 2023-04-11
Payer: COMMERCIAL

## 2023-04-11 PROCEDURE — 99396 PREV VISIT EST AGE 40-64: CPT | Mod: 25

## 2023-04-11 PROCEDURE — 76830 TRANSVAGINAL US NON-OB: CPT

## 2023-04-11 PROCEDURE — 82270 OCCULT BLOOD FECES: CPT

## 2023-04-11 PROCEDURE — 81002 URINALYSIS NONAUTO W/O SCOPE: CPT

## 2023-09-10 ENCOUNTER — NON-APPOINTMENT (OUTPATIENT)
Age: 58
End: 2023-09-10

## 2023-09-10 NOTE — PHYSICAL EXAM
[Normal] : supple, no neck mass and thyroid not enlarged [Normal Neck Lymph Nodes] : normal neck lymph nodes  [Normal Supraclavicular Lymph Nodes] : normal supraclavicular lymph nodes [Normal Groin Lymph Nodes] : normal groin lymph nodes [Normal Axillary Lymph Nodes] : normal axillary lymph nodes [Normal] : normal appearance, no rash, nodules, vesicles, ulcers, erythema [de-identified] : groins not examined [de-identified] : See diagram

## 2023-09-10 NOTE — HISTORY OF PRESENT ILLNESS
[de-identified] : 2020, her son started his first year at [employer]   58-year-old lady (originally under the care of Dr. Piper) who I have seen for periodic breast examinations, since 2000.   2016: Right breast atypia.- DCD +incidental SARCOID - NO rheum eval yet + Tamoxifen: Dr. Will HAMLIN, completed 19: Seen by Dr Sarah Martinez (pulmonary) for bilateral Pulmonary nodules on CT chest. No intervention warranted. Ongoing follow-up.  She has had NINE other breast biopsies: (Bilateral excisional breast biopsies were benign. She has had 5 right breast benign biopsies. A single left breast needle biopsy was also benign. 2013 right MRI core biopsy benign and concordant : Right needle biopsy (DCD)-benign)   Ghada score= 20  NO personal history of breast OR ovarian cancer.  +FH: A maternal aunt had breast cancer at 68 Her maternal grandmother had breast cancer in her late 60s One Maternal cousins had ovarian cancer in their 50s  +ASHKENAZI.   Other family history of malignancy: Father: CRC Paternal grandmother also had colon cancer. Her maternal grandmother had breast cancer (above) colon cancer, and lung cancer.  Her mother AND a brother had melanoma.  Paternal grandfather: Lung cancer. Paternal uncle pancreatic cancer. Maternal grandfather: "Abdominal cancer"   Her own genetic testing demonstrates NO deleterious dictations. Since her original testing was ~, she had a followup appointment with Cristina Tucker,2019  - Unremarkable   Menarche at 13. , first at : The morning of her visit with me, she felt a possible abnormality in the UIQ of the right breast. This was an area she has been aware of intermittently for the past several years. It waxes and wanes w/o other signs/symptoms or provocative/palliative factors. No trauma/strain.  My PE: No discrete visible or palpable abnormality. Her area of concern was right breast, 1:00, ~4 FBFN.   She currently does not have an internist. She used to see Dr. Arnoldo Isabel, and then Dr. Cheryl Rivas.  Allergic: Mupirocin. Bacitracin. Iodine solution.  No pacemaker or defibrillator. No anticoagulants.  She completed a 5-year course of tamoxifen, for risk reduction due to the diagnosis of atypia, in .  + Osteoporosis. Endocrinology: Dr.Barry NASH. Treated with alendronate, vitamin D, and calcium   Her gynecologist is Dr. Theodore ARRINGTON 2021 Pap smear was normal 2018 she was seen by gyn-onc (Dr. Jessica Acosta) for a thickened endometrium.   Colonoscopy: 2022 with DR Wilberto TINAJERO, ok x 5 yr. (............... )  +FH: Father had colorectal cancer   Known, asymptomatic, incidental, ~1 cm pancreatic mass. 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 GI: Dr. Travis Murry, NOW DR Tamas GONDA She had seen my partner, JW. She now follows up at [facility].   Other relatives with a history of malignancy: Father  of colorectal cancer (above). Paternal grandmother also had colon cancer. Her maternal grandmother had breast cancer (above) colon cancer, and lung cancer.  Her mother and a brother had melanoma. No personal history of skin cancer. She's dermatology twice a year (Dr. Angela Russell).  Paternal grandfather: Lung cancer. Paternal uncle pancreatic cancer. Maternal grandfather: "Abdominal cancer"

## 2023-09-10 NOTE — ASSESSMENT
[FreeTextEntry1] : 1/26/2023: Bilateral mammogram and sonogram at 450: BI-RADS 2.  Prescription entered for January 2024.   July 2022: Bilateral breast MRI at 450: BI-RADS 2. We will repeat periodically, balancing her increased risk of breast cancer against the concern of cumulative gadolinium exposure. Ghada risk score = 20  Clinically doing well.  If no problems we will see her in another year, sooner if needed

## 2023-09-10 NOTE — REVIEW OF SYSTEMS
[Negative] : Integumentary [FreeTextEntry7] : Allergic to iodine, bacitracin, and mupirocin   [FreeTextEntry8] : Family history of CRC [de-identified] : Osteoporosis [FreeTextEntry1] : Increased risk of breast cancer

## 2023-09-11 ENCOUNTER — APPOINTMENT (OUTPATIENT)
Dept: SURGICAL ONCOLOGY | Facility: CLINIC | Age: 58
End: 2023-09-11
Payer: COMMERCIAL

## 2023-09-11 VITALS
DIASTOLIC BLOOD PRESSURE: 77 MMHG | WEIGHT: 114 LBS | HEIGHT: 59 IN | BODY MASS INDEX: 22.98 KG/M2 | OXYGEN SATURATION: 97 % | HEART RATE: 80 BPM | SYSTOLIC BLOOD PRESSURE: 111 MMHG | RESPIRATION RATE: 16 BRPM

## 2023-09-11 PROCEDURE — 99214 OFFICE O/P EST MOD 30 MIN: CPT

## 2023-09-28 ENCOUNTER — APPOINTMENT (OUTPATIENT)
Dept: ENDOCRINOLOGY | Facility: CLINIC | Age: 58
End: 2023-09-28
Payer: COMMERCIAL

## 2023-09-28 VITALS
HEIGHT: 59 IN | OXYGEN SATURATION: 99 % | DIASTOLIC BLOOD PRESSURE: 65 MMHG | SYSTOLIC BLOOD PRESSURE: 110 MMHG | WEIGHT: 112 LBS | HEART RATE: 90 BPM | BODY MASS INDEX: 22.58 KG/M2

## 2023-09-28 PROCEDURE — 99214 OFFICE O/P EST MOD 30 MIN: CPT | Mod: 25

## 2023-09-28 PROCEDURE — 36415 COLL VENOUS BLD VENIPUNCTURE: CPT

## 2023-09-29 LAB
25(OH)D3 SERPL-MCNC: 61 NG/ML
ALBUMIN SERPL ELPH-MCNC: 5.1 G/DL
ALP BLD-CCNC: 52 U/L
ALT SERPL-CCNC: 5 U/L
ANION GAP SERPL CALC-SCNC: 13 MMOL/L
AST SERPL-CCNC: 30 U/L
BILIRUB SERPL-MCNC: 0.5 MG/DL
BUN SERPL-MCNC: 12 MG/DL
CALCIUM SERPL-MCNC: 10.1 MG/DL
CALCIUM SERPL-MCNC: 10.1 MG/DL
CHLORIDE SERPL-SCNC: 101 MMOL/L
CO2 SERPL-SCNC: 28 MMOL/L
CREAT SERPL-MCNC: 0.55 MG/DL
EGFR: 107 ML/MIN/1.73M2
FOLATE SERPL-MCNC: >20 NG/ML
GLUCOSE SERPL-MCNC: 68 MG/DL
MAGNESIUM SERPL-MCNC: 2.4 MG/DL
PARATHYROID HORMONE INTACT: 34 PG/ML
PHOSPHATE SERPL-MCNC: 3.6 MG/DL
POTASSIUM SERPL-SCNC: 4.7 MMOL/L
PROT SERPL-MCNC: 8.1 G/DL
SODIUM SERPL-SCNC: 142 MMOL/L
T3FREE SERPL-MCNC: 3.92 PG/ML
T4 FREE SERPL-MCNC: 1.6 NG/DL
TSH SERPL-ACNC: 1.9 UIU/ML
VIT B12 SERPL-MCNC: 596 PG/ML

## 2023-10-01 PROBLEM — K86.89 PANCREATIC MASS: Status: ACTIVE | Noted: 2019-02-26

## 2023-12-21 ENCOUNTER — NON-APPOINTMENT (OUTPATIENT)
Age: 58
End: 2023-12-21

## 2024-01-29 ENCOUNTER — APPOINTMENT (OUTPATIENT)
Dept: MAMMOGRAPHY | Facility: IMAGING CENTER | Age: 59
End: 2024-01-29
Payer: COMMERCIAL

## 2024-01-29 ENCOUNTER — OUTPATIENT (OUTPATIENT)
Dept: OUTPATIENT SERVICES | Facility: HOSPITAL | Age: 59
LOS: 1 days | End: 2024-01-29
Payer: COMMERCIAL

## 2024-01-29 ENCOUNTER — APPOINTMENT (OUTPATIENT)
Dept: ULTRASOUND IMAGING | Facility: IMAGING CENTER | Age: 59
End: 2024-01-29
Payer: COMMERCIAL

## 2024-01-29 ENCOUNTER — RESULT REVIEW (OUTPATIENT)
Age: 59
End: 2024-01-29

## 2024-01-29 DIAGNOSIS — R89.7 ABNORMAL HISTOLOGICAL FINDINGS IN SPECIMENS FROM OTHER ORGANS, SYSTEMS AND TISSUES: Chronic | ICD-10-CM

## 2024-01-29 DIAGNOSIS — N60.91 UNSPECIFIED BENIGN MAMMARY DYSPLASIA OF RIGHT BREAST: ICD-10-CM

## 2024-01-29 PROCEDURE — 77063 BREAST TOMOSYNTHESIS BI: CPT | Mod: 26

## 2024-01-29 PROCEDURE — 76641 ULTRASOUND BREAST COMPLETE: CPT

## 2024-01-29 PROCEDURE — 77063 BREAST TOMOSYNTHESIS BI: CPT

## 2024-01-29 PROCEDURE — 76641 ULTRASOUND BREAST COMPLETE: CPT | Mod: 26,50

## 2024-01-29 PROCEDURE — 77067 SCR MAMMO BI INCL CAD: CPT | Mod: 26

## 2024-01-29 PROCEDURE — 77067 SCR MAMMO BI INCL CAD: CPT

## 2024-03-05 DIAGNOSIS — R79.89 OTHER SPECIFIED ABNORMAL FINDINGS OF BLOOD CHEMISTRY: ICD-10-CM

## 2024-03-27 ENCOUNTER — APPOINTMENT (OUTPATIENT)
Dept: ENDOCRINOLOGY | Facility: CLINIC | Age: 59
End: 2024-03-27
Payer: COMMERCIAL

## 2024-03-27 VITALS
TEMPERATURE: 97.6 F | WEIGHT: 113.19 LBS | SYSTOLIC BLOOD PRESSURE: 120 MMHG | HEART RATE: 81 BPM | OXYGEN SATURATION: 99 % | BODY MASS INDEX: 22.82 KG/M2 | DIASTOLIC BLOOD PRESSURE: 70 MMHG | HEIGHT: 59 IN

## 2024-03-27 DIAGNOSIS — E55.9 VITAMIN D DEFICIENCY, UNSPECIFIED: ICD-10-CM

## 2024-03-27 DIAGNOSIS — C7A.8 OTHER MALIGNANT NEUROENDOCRINE TUMORS: ICD-10-CM

## 2024-03-27 DIAGNOSIS — M81.0 AGE-RELATED OSTEOPOROSIS W/OUT CURRENT PATHOLOGICAL FRACTURE: ICD-10-CM

## 2024-03-27 DIAGNOSIS — E78.5 HYPERLIPIDEMIA, UNSPECIFIED: ICD-10-CM

## 2024-03-27 DIAGNOSIS — N60.91 UNSPECIFIED BENIGN MAMMARY DYSPLASIA OF RIGHT BREAST: ICD-10-CM

## 2024-03-27 DIAGNOSIS — D86.9 SARCOIDOSIS, UNSPECIFIED: ICD-10-CM

## 2024-03-27 PROCEDURE — 99214 OFFICE O/P EST MOD 30 MIN: CPT

## 2024-03-27 NOTE — HISTORY OF PRESENT ILLNESS
[FreeTextEntry1] : Ms. HOOPER is a 58-year-old female who returns with regard to a history of osteoporosis. She is currently maintained on weekly Fosamax.   Latest DEXA scan from 03/22/2023 compared to previous study from 03/09/2021 did show: T score spine: -2.3; previously -2.6; up 4.5 % from 03/09/2021 T score femoral neck left: -1.6, previously -1.2  T score total hip: -1.4, previously -1.5 (no significant change from prior)  She was on Tamoxifen for 5 years. She was then put on Fosamax weekly in March of 2021 and continues -tolerating well. No adverse effects.  Currently, she too is taking Citracal 1 tab in the am- away from the alendronate. She does try to take calcium via diet.  Also taking Vitamin D3 1,000 iu daily.   FMHx: mother hx of osteoporosis on Prolia. Too, aunt hx of osteoporosis on Prolia.   Denies any bone fractures.   Denies long-term corticosteroid usage.  Menopause began around age 53.   Walks over 4 miles daily.   Recent labs from [facility] (about 1 week ago) unremarkable.  vitamin D 25-OH: 45  LDL 82,  _______________________________  Additional medical history includes that of atypical breast ductal hyperplasia with hx of multiple breast biopsies- followed by Dr. Obrien.  - Follows with Dr. Gonzlaez watching Chromogranin levels - Follows with Dr. Venice richter Sarcoidosis.   Additional Medications: Rosuvastatin per Dr. Casillas.

## 2024-03-27 NOTE — ADDENDUM
[FreeTextEntry1] : This note was written by Perez Gómez on 03/27/2024 acting as medical scribe for Dr. Qasim Kellogg. I, Dr. Qasim Kellogg, have read and attest that all the information, medical decision making and discharge instructions within are true and accurate.

## 2024-04-24 RX ORDER — ALENDRONATE SODIUM 70 MG/1
70 TABLET ORAL
Qty: 3 | Refills: 2 | Status: ACTIVE | COMMUNITY
Start: 2021-04-07 | End: 1900-01-01

## 2024-04-26 ENCOUNTER — APPOINTMENT (OUTPATIENT)
Dept: OBGYN | Facility: CLINIC | Age: 59
End: 2024-04-26
Payer: COMMERCIAL

## 2024-04-26 PROCEDURE — 82270 OCCULT BLOOD FECES: CPT

## 2024-04-26 PROCEDURE — 99396 PREV VISIT EST AGE 40-64: CPT | Mod: 25

## 2024-04-26 PROCEDURE — 81002 URINALYSIS NONAUTO W/O SCOPE: CPT

## 2024-04-26 PROCEDURE — 76830 TRANSVAGINAL US NON-OB: CPT

## 2024-04-26 PROCEDURE — 99459 PELVIC EXAMINATION: CPT

## 2024-07-19 ENCOUNTER — APPOINTMENT (OUTPATIENT)
Dept: OBGYN | Facility: CLINIC | Age: 59
End: 2024-07-19

## 2024-07-19 PROCEDURE — 56820 COLPOSCOPY VULVA: CPT

## 2024-07-19 PROCEDURE — 99213 OFFICE O/P EST LOW 20 MIN: CPT | Mod: 25

## 2024-09-04 DIAGNOSIS — E55.9 VITAMIN D DEFICIENCY, UNSPECIFIED: ICD-10-CM

## 2024-09-11 ENCOUNTER — APPOINTMENT (OUTPATIENT)
Dept: ENDOCRINOLOGY | Facility: CLINIC | Age: 59
End: 2024-09-11
Payer: COMMERCIAL

## 2024-09-11 VITALS
BODY MASS INDEX: 22.84 KG/M2 | HEART RATE: 81 BPM | HEIGHT: 59 IN | TEMPERATURE: 97.6 F | OXYGEN SATURATION: 97 % | WEIGHT: 113.31 LBS | SYSTOLIC BLOOD PRESSURE: 120 MMHG | DIASTOLIC BLOOD PRESSURE: 80 MMHG

## 2024-09-11 DIAGNOSIS — M81.0 AGE-RELATED OSTEOPOROSIS W/OUT CURRENT PATHOLOGICAL FRACTURE: ICD-10-CM

## 2024-09-11 DIAGNOSIS — C7A.8 OTHER MALIGNANT NEUROENDOCRINE TUMORS: ICD-10-CM

## 2024-09-11 DIAGNOSIS — R73.03 PREDIABETES.: ICD-10-CM

## 2024-09-11 DIAGNOSIS — R79.89 OTHER SPECIFIED ABNORMAL FINDINGS OF BLOOD CHEMISTRY: ICD-10-CM

## 2024-09-11 DIAGNOSIS — E78.5 HYPERLIPIDEMIA, UNSPECIFIED: ICD-10-CM

## 2024-09-11 PROCEDURE — 99214 OFFICE O/P EST MOD 30 MIN: CPT

## 2024-09-11 NOTE — HISTORY OF PRESENT ILLNESS
[FreeTextEntry1] : Ms. HOOPER is a 59 year old female who returns with regard to a history of osteoporosis. Too, with hx of neuroendocrine pancreatic lesion. Does continue on weekly Alendronate. She continues to follow with Dr. Cindy HDEZ - her pancreatic oncologist. Mat been followed with Pet Ct scans.  she presented to ER and was found to have salivary gland stone and was placed on antibiotics, now has resolved Has been having abdominal pain as of late, pending CT abdomen   Too follows with Dr. Gonzalez watching Chromogranin  levels Too follows with Dr. Millard re Sarcoidosis  Latest dexa scan from 03/22/2023  did show: T score spine  -2.3    up 4.5 % from 03/09/2021 T score femoral neck left-1.6 T score total hip  -1.4 No change from prior   Prior dexa scan from 03/09/2021 did show: T score spine -2.6 T score femoral neck left -1.2 T score total hip -1.5   She was on Tamoxifen for 5 years Too, has been taking Vitamin D3   She was put on Fosamax weekly in March of 2021 and continues -tolerating well. No adverse effects.  Currently, she too is taking Citracal 1 tab in the am-away from the alendronate. She does try to take calcium via diet.  Taking Vitamin D3 1,000 iu daily.   FH  Mom and maternal aunt with significant Osteoporosis  denies any bone fractures.  Denies long-term corticosteroid usage.  Menopause began around age 53.   labs 9/6/24 [facility]  LDL 67  gfr 94  cr 0.74 pth 31  bone alk phos 7.7  tsh 1.66  osteocalcin 12  vitamin d3 56  ctx 234  Additional medical history includes that of atypical breast ductal hyperplasia with hx of multiple breast biopsies-followed by Dr. Obrien.  Walks over 4 miles daily  Too, hx of sarcoidosis following Dr. Romeo  FMHx: mother hx of osteoporosis on Prolia. Too, aunt hx of osteoporosis on Prolia.

## 2024-10-14 PROBLEM — Z91.89 INCREASED RISK OF BREAST CANCER: Status: ACTIVE | Noted: 2024-10-14

## 2024-10-15 ENCOUNTER — NON-APPOINTMENT (OUTPATIENT)
Age: 59
End: 2024-10-15

## 2024-10-17 ENCOUNTER — APPOINTMENT (OUTPATIENT)
Dept: SURGICAL ONCOLOGY | Facility: CLINIC | Age: 59
End: 2024-10-17

## 2024-10-17 DIAGNOSIS — Z91.89 OTHER SPECIFIED PERSONAL RISK FACTORS, NOT ELSEWHERE CLASSIFIED: ICD-10-CM

## 2024-11-04 ENCOUNTER — APPOINTMENT (OUTPATIENT)
Dept: SURGICAL ONCOLOGY | Facility: CLINIC | Age: 59
End: 2024-11-04

## 2024-11-11 ENCOUNTER — APPOINTMENT (OUTPATIENT)
Dept: OBGYN | Facility: CLINIC | Age: 59
End: 2024-11-11
Payer: COMMERCIAL

## 2024-11-11 PROCEDURE — 76830 TRANSVAGINAL US NON-OB: CPT

## 2024-11-11 PROCEDURE — 99214 OFFICE O/P EST MOD 30 MIN: CPT

## 2025-01-31 ENCOUNTER — OUTPATIENT (OUTPATIENT)
Dept: OUTPATIENT SERVICES | Facility: HOSPITAL | Age: 60
LOS: 1 days | End: 2025-01-31
Payer: COMMERCIAL

## 2025-01-31 ENCOUNTER — RESULT REVIEW (OUTPATIENT)
Age: 60
End: 2025-01-31

## 2025-01-31 ENCOUNTER — APPOINTMENT (OUTPATIENT)
Dept: ULTRASOUND IMAGING | Facility: IMAGING CENTER | Age: 60
End: 2025-01-31
Payer: COMMERCIAL

## 2025-01-31 ENCOUNTER — APPOINTMENT (OUTPATIENT)
Dept: MAMMOGRAPHY | Facility: IMAGING CENTER | Age: 60
End: 2025-01-31
Payer: COMMERCIAL

## 2025-01-31 DIAGNOSIS — Z00.8 ENCOUNTER FOR OTHER GENERAL EXAMINATION: ICD-10-CM

## 2025-01-31 DIAGNOSIS — R89.7 ABNORMAL HISTOLOGICAL FINDINGS IN SPECIMENS FROM OTHER ORGANS, SYSTEMS AND TISSUES: Chronic | ICD-10-CM

## 2025-01-31 PROCEDURE — 77067 SCR MAMMO BI INCL CAD: CPT | Mod: 26

## 2025-01-31 PROCEDURE — 77063 BREAST TOMOSYNTHESIS BI: CPT | Mod: 26

## 2025-01-31 PROCEDURE — 76641 ULTRASOUND BREAST COMPLETE: CPT

## 2025-01-31 PROCEDURE — 77063 BREAST TOMOSYNTHESIS BI: CPT

## 2025-01-31 PROCEDURE — 77067 SCR MAMMO BI INCL CAD: CPT

## 2025-01-31 PROCEDURE — 76641 ULTRASOUND BREAST COMPLETE: CPT | Mod: 26,50

## 2025-02-06 ENCOUNTER — APPOINTMENT (OUTPATIENT)
Dept: ULTRASOUND IMAGING | Facility: IMAGING CENTER | Age: 60
End: 2025-02-06
Payer: COMMERCIAL

## 2025-02-06 ENCOUNTER — OUTPATIENT (OUTPATIENT)
Dept: OUTPATIENT SERVICES | Facility: HOSPITAL | Age: 60
LOS: 1 days | End: 2025-02-06
Payer: COMMERCIAL

## 2025-02-06 DIAGNOSIS — Z00.8 ENCOUNTER FOR OTHER GENERAL EXAMINATION: ICD-10-CM

## 2025-02-06 PROCEDURE — 76642 ULTRASOUND BREAST LIMITED: CPT | Mod: 26,LT

## 2025-02-06 PROCEDURE — 76642 ULTRASOUND BREAST LIMITED: CPT

## 2025-02-14 ENCOUNTER — OUTPATIENT (OUTPATIENT)
Dept: OUTPATIENT SERVICES | Facility: HOSPITAL | Age: 60
LOS: 1 days | End: 2025-02-14
Payer: COMMERCIAL

## 2025-02-14 ENCOUNTER — APPOINTMENT (OUTPATIENT)
Dept: ULTRASOUND IMAGING | Facility: IMAGING CENTER | Age: 60
End: 2025-02-14
Payer: COMMERCIAL

## 2025-02-14 DIAGNOSIS — Z00.8 ENCOUNTER FOR OTHER GENERAL EXAMINATION: ICD-10-CM

## 2025-02-14 DIAGNOSIS — R92.8 OTHER ABNORMAL AND INCONCLUSIVE FINDINGS ON DIAGNOSTIC IMAGING OF BREAST: ICD-10-CM

## 2025-02-14 DIAGNOSIS — R89.7 ABNORMAL HISTOLOGICAL FINDINGS IN SPECIMENS FROM OTHER ORGANS, SYSTEMS AND TISSUES: Chronic | ICD-10-CM

## 2025-02-14 DIAGNOSIS — N60.91 UNSPECIFIED BENIGN MAMMARY DYSPLASIA OF RIGHT BREAST: ICD-10-CM

## 2025-02-14 PROCEDURE — 77065 DX MAMMO INCL CAD UNI: CPT

## 2025-02-14 PROCEDURE — 88305 TISSUE EXAM BY PATHOLOGIST: CPT

## 2025-02-14 PROCEDURE — 19083 BX BREAST 1ST LESION US IMAG: CPT | Mod: LT

## 2025-02-14 PROCEDURE — 19083 BX BREAST 1ST LESION US IMAG: CPT

## 2025-02-14 PROCEDURE — 77065 DX MAMMO INCL CAD UNI: CPT | Mod: 26,LT

## 2025-02-14 PROCEDURE — 88305 TISSUE EXAM BY PATHOLOGIST: CPT | Mod: 26

## 2025-03-26 ENCOUNTER — OUTPATIENT (OUTPATIENT)
Dept: OUTPATIENT SERVICES | Facility: HOSPITAL | Age: 60
LOS: 1 days | End: 2025-03-26
Payer: COMMERCIAL

## 2025-03-26 ENCOUNTER — APPOINTMENT (OUTPATIENT)
Dept: RADIOLOGY | Facility: IMAGING CENTER | Age: 60
End: 2025-03-26
Payer: COMMERCIAL

## 2025-03-26 DIAGNOSIS — R89.7 ABNORMAL HISTOLOGICAL FINDINGS IN SPECIMENS FROM OTHER ORGANS, SYSTEMS AND TISSUES: Chronic | ICD-10-CM

## 2025-03-26 DIAGNOSIS — M81.0 AGE-RELATED OSTEOPOROSIS WITHOUT CURRENT PATHOLOGICAL FRACTURE: ICD-10-CM

## 2025-03-26 PROCEDURE — 77080 DXA BONE DENSITY AXIAL: CPT | Mod: 26

## 2025-03-26 PROCEDURE — 77080 DXA BONE DENSITY AXIAL: CPT

## 2025-04-04 ENCOUNTER — NON-APPOINTMENT (OUTPATIENT)
Age: 60
End: 2025-04-04

## 2025-04-09 ENCOUNTER — APPOINTMENT (OUTPATIENT)
Dept: ENDOCRINOLOGY | Facility: CLINIC | Age: 60
End: 2025-04-09
Payer: COMMERCIAL

## 2025-04-09 VITALS
HEIGHT: 59 IN | OXYGEN SATURATION: 97 % | WEIGHT: 112.25 LBS | TEMPERATURE: 97 F | BODY MASS INDEX: 22.63 KG/M2 | HEART RATE: 89 BPM | DIASTOLIC BLOOD PRESSURE: 70 MMHG | SYSTOLIC BLOOD PRESSURE: 110 MMHG

## 2025-04-09 DIAGNOSIS — C7A.8 OTHER MALIGNANT NEUROENDOCRINE TUMORS: ICD-10-CM

## 2025-04-09 DIAGNOSIS — E55.9 VITAMIN D DEFICIENCY, UNSPECIFIED: ICD-10-CM

## 2025-04-09 DIAGNOSIS — R73.03 PREDIABETES.: ICD-10-CM

## 2025-04-09 DIAGNOSIS — D86.9 SARCOIDOSIS, UNSPECIFIED: ICD-10-CM

## 2025-04-09 DIAGNOSIS — E78.5 HYPERLIPIDEMIA, UNSPECIFIED: ICD-10-CM

## 2025-04-09 DIAGNOSIS — M81.0 AGE-RELATED OSTEOPOROSIS W/OUT CURRENT PATHOLOGICAL FRACTURE: ICD-10-CM

## 2025-04-09 PROCEDURE — G2211 COMPLEX E/M VISIT ADD ON: CPT | Mod: NC

## 2025-04-09 PROCEDURE — 99214 OFFICE O/P EST MOD 30 MIN: CPT

## 2025-07-30 ENCOUNTER — APPOINTMENT (OUTPATIENT)
Dept: MRI IMAGING | Facility: IMAGING CENTER | Age: 60
End: 2025-07-30
Payer: COMMERCIAL

## 2025-07-30 ENCOUNTER — OUTPATIENT (OUTPATIENT)
Dept: OUTPATIENT SERVICES | Facility: HOSPITAL | Age: 60
LOS: 1 days | End: 2025-07-30
Payer: COMMERCIAL

## 2025-07-30 DIAGNOSIS — R89.7 ABNORMAL HISTOLOGICAL FINDINGS IN SPECIMENS FROM OTHER ORGANS, SYSTEMS AND TISSUES: Chronic | ICD-10-CM

## 2025-07-30 DIAGNOSIS — Z00.8 ENCOUNTER FOR OTHER GENERAL EXAMINATION: ICD-10-CM

## 2025-07-30 DIAGNOSIS — Z91.89 OTHER SPECIFIED PERSONAL RISK FACTORS, NOT ELSEWHERE CLASSIFIED: ICD-10-CM

## 2025-07-30 PROCEDURE — C8908: CPT

## 2025-07-30 PROCEDURE — 77049 MRI BREAST C-+ W/CAD BI: CPT | Mod: 26

## 2025-07-30 PROCEDURE — A9585: CPT

## 2025-07-30 PROCEDURE — C8937: CPT
